# Patient Record
Sex: FEMALE | Race: WHITE | NOT HISPANIC OR LATINO | Employment: FULL TIME | URBAN - METROPOLITAN AREA
[De-identification: names, ages, dates, MRNs, and addresses within clinical notes are randomized per-mention and may not be internally consistent; named-entity substitution may affect disease eponyms.]

---

## 2023-09-19 LAB
CHLAMYDIA TRACH., AMPLIFIED: NEGATIVE
N. GONORRHEA, AMPLIFIED: NEGATIVE

## 2023-10-20 PROBLEM — O16.9 ELEVATED BLOOD PRESSURE AFFECTING PREGNANCY, ANTEPARTUM (HHS-HCC): Status: ACTIVE | Noted: 2023-10-20

## 2023-10-20 PROBLEM — I10 HYPERTENSION, ESSENTIAL: Status: ACTIVE | Noted: 2023-10-20

## 2023-10-20 PROBLEM — O35.2XX1 HEREDITARY DISEASE IN FAMILY POSSIBLY AFFECTING FETUS, AFFECTING MANAGEMENT OF MOTHER IN PREGNANCY, FETUS 1 (HHS-HCC): Status: ACTIVE | Noted: 2023-10-20

## 2023-10-20 PROBLEM — O10.919 CHRONIC HYPERTENSION DURING PREGNANCY, ANTEPARTUM (HHS-HCC): Status: ACTIVE | Noted: 2023-10-20

## 2023-10-20 PROBLEM — O09.819 PREGNANCY RESULTING FROM IN-VITRO FERTILIZATION (HHS-HCC): Status: ACTIVE | Noted: 2023-10-20

## 2023-10-20 PROBLEM — Z14.8 CARRIER OF GENETIC DISORDER: Status: ACTIVE | Noted: 2023-10-20

## 2023-10-20 PROBLEM — F41.9 ANXIETY AND DEPRESSION: Status: ACTIVE | Noted: 2023-10-20

## 2023-10-20 PROBLEM — F32.A ANXIETY AND DEPRESSION: Status: ACTIVE | Noted: 2023-10-20

## 2023-10-20 PROBLEM — E66.811 OBESITY (BMI 30.0-34.9): Status: ACTIVE | Noted: 2023-10-20

## 2023-10-20 PROBLEM — R76.8 DS DNA ANTIBODY POSITIVE: Status: ACTIVE | Noted: 2023-10-20

## 2023-10-20 PROBLEM — E66.9 OBESITY (BMI 30.0-34.9): Status: ACTIVE | Noted: 2023-10-20

## 2023-10-20 RX ORDER — ALPRAZOLAM 0.5 MG/1
1 TABLET ORAL 3 TIMES DAILY PRN
COMMUNITY
Start: 2022-09-13

## 2023-10-20 RX ORDER — LEVONORGESTREL 52 MG/1
INTRAUTERINE DEVICE INTRAUTERINE
COMMUNITY
Start: 2023-09-18

## 2023-10-20 RX ORDER — ASCORBIC ACID, CHOLECALCIFEROL, .ALPHA.-TOCOPHEROL ACETATE, DL-, PYRIDOXINE, FOLIC ACID, CYANOCOBALAMIN, CALCIUM, FERROUS FUMARATE, MAGNESIUM, DOCONEXENT 85; 200; 10; 25; 1; 12; 140; 27; 45; 300 [IU]/1; [IU]/1; [IU]/1; [IU]/1; MG/1; UG/1; MG/1; MG/1; MG/1; MG/1
CAPSULE, GELATIN COATED ORAL DAILY
COMMUNITY
Start: 2020-12-15

## 2023-10-20 RX ORDER — SERTRALINE HYDROCHLORIDE 100 MG/1
1 TABLET, FILM COATED ORAL DAILY
COMMUNITY
Start: 2022-09-13

## 2023-10-23 ENCOUNTER — OFFICE VISIT (OUTPATIENT)
Dept: OBSTETRICS AND GYNECOLOGY | Facility: CLINIC | Age: 33
End: 2023-10-23
Payer: COMMERCIAL

## 2023-10-23 VITALS — BODY MASS INDEX: 37.28 KG/M2 | DIASTOLIC BLOOD PRESSURE: 76 MMHG | SYSTOLIC BLOOD PRESSURE: 104 MMHG | WEIGHT: 224 LBS

## 2023-10-23 DIAGNOSIS — N93.8 DUB (DYSFUNCTIONAL UTERINE BLEEDING): Primary | ICD-10-CM

## 2023-10-23 PROCEDURE — 99213 OFFICE O/P EST LOW 20 MIN: CPT | Performed by: OBSTETRICS & GYNECOLOGY

## 2023-10-23 PROCEDURE — 3074F SYST BP LT 130 MM HG: CPT | Performed by: OBSTETRICS & GYNECOLOGY

## 2023-10-23 PROCEDURE — 3078F DIAST BP <80 MM HG: CPT | Performed by: OBSTETRICS & GYNECOLOGY

## 2023-10-23 NOTE — PROGRESS NOTES
Patient presents for IUD check.  First period was spotty and moderate but this was approximately 2 weeks after placement of the progestin IUD.  Has had no pain or discomfort.  Cannot feel strings, or cervix.    REVIEW OF SYSTEMS    Please see HPI for reported pertinent positives, which would supersede this ROS    Constitutional:  Denies fever, chills, wt gain or loss, fatigue    Genito-Urinary:  Denies genital lesion or sores, vaginal dryness, itching  or pain.  No abnormal vaginal discharge or unexplained vaginal bleeding.  No dysuria, urinary incontinence or frequency.  Denies pelvic pain, dysmenorrhea or dyspareunia.    Eyes:  Denies vision changes, dryness  ENT:  No hearing loss, sinus pain or congestion, nosebleeds  Cardiovascular:  No chest pain or palpitations  Respiratory:  No SOB, cough, wheezing  GI:  No Nausea, vomiting, diarrhea, constipation, abdominal pain  Musculoskeletal:  No new back pain. joint pain, peripheral edema  Skin:  No rash or skin lesion  Neurologic:  No HA, numbness or dizziness  Psychiatric:  No new anxiety or depression  Endocrine:  No loss of hair or hirsutism  Hematologic/lymphatic:  No swollen lymph nodes.  No reported tendency for easy bruising or bleeding    Patient admits to no other systemic complaints      PHYSICAL EXAM      PSYCH:  Pt is alert, oriented and cooperative    SKIN: warm, dry, w/o lesion    HEAD AND FACE:  External inspection of eyes, ears, functional cranial nerves normal and intact    THYROID:  No thyromegaly    CARDIOVASCULAR:  Warm and well Perfused    PULMONARY:  No respiratory distress    ABDOMEN:  soft, nontender.  No mass or organomegaly.      PELVIC:    External genitalia, urethra, perianal region normal to inspection and palpation if indicated.  No inguinal LA    Vagina without lesions.    Cervix seen and visually normal  Strings seen, folding in posterior fornix    Assessment/Plan    DU B associated with IUD placement.  Follow-up as needed.

## 2023-11-07 ENCOUNTER — TELEMEDICINE (OUTPATIENT)
Dept: BEHAVIORAL HEALTH | Facility: CLINIC | Age: 33
End: 2023-11-07
Payer: COMMERCIAL

## 2023-11-07 DIAGNOSIS — F32.A ANXIETY AND DEPRESSION: Primary | ICD-10-CM

## 2023-11-07 DIAGNOSIS — F41.9 ANXIETY AND DEPRESSION: Primary | ICD-10-CM

## 2023-11-07 PROCEDURE — 90837 PSYTX W PT 60 MINUTES: CPT | Mod: 95 | Performed by: PSYCHOLOGIST

## 2023-11-07 NOTE — PROGRESS NOTES
START TIME: 4:05  END TIME: 5p  TOTAL TIME FACE TO FACE: 55 mins    Subjective   Patient ID: Adrienne Cedeño is a 33 y.o. female who presents for   Problem List Items Addressed This Visit       Anxiety and depression - Primary   .    Virtual or Telephone Consent    An interactive audio and video telecommunication system which permits real time communications between the patient (at the originating site) and provider (at the distant site) was utilized to provide this telehealth service.   Verbal consent was requested and obtained from Adrienne Cedeño on this date, 11/08/23 for a telehealth visit.      CONTENT OF SESSION: This was a followup appointment between provider and patient to address symptoms of trauma and anxiety and grief, associated with reproductive loss.     Adrienne reported overall doing “okay” in terms of mood. She described experiences of grief associated with reproductive loss and trauma and described her coping strategies (e.g., social support, limit setting). She noted some distress in context of decision making about potential future intended pregnancy. Time was spent on emotional processing of grief and decision making. Provider gave psychoeducation on positive guided imagery skill and time was spent on discussion of application of this skill to intended future pregnancy. plan made for bimonthly cbt for management of grief and distress with reproductive loss. Provider also gave supportive counseling and normalized common emotional experiences.     Objective   Social History     Substance and Sexual Activity   Alcohol Use Yes    Alcohol/week: 0.0 - 1.0 standard drinks of alcohol      Social History     Social History Narrative    Not on file        Assessment/Plan   Problem List Items Addressed This Visit       Anxiety and depression - Primary   PLAn: plan made for individual CBT to address grief and posttraumatic stress symptoms r/o PTSD and r/lo generalized anxiety disorder.     PRACTICE EXERCISES  PROVIDED CUMULATIVELY: read handout on [employer]AR [employer] . read handout provided on sleep hygiene, and practice cognitive defusion as discussed. practice cognitive restructuring and use the thought record form and challenging questions worksheet. read handouts provided on loss, trauma, and ptsd. practice behavioral activation as discussed (read handout). look at website resource provided on PTSD treatment. followup in 2 weeks

## 2023-11-20 ENCOUNTER — TELEMEDICINE (OUTPATIENT)
Dept: BEHAVIORAL HEALTH | Facility: CLINIC | Age: 33
End: 2023-11-20
Payer: COMMERCIAL

## 2023-11-20 DIAGNOSIS — F41.9 ANXIETY AND DEPRESSION: Primary | ICD-10-CM

## 2023-11-20 DIAGNOSIS — F32.A ANXIETY AND DEPRESSION: Primary | ICD-10-CM

## 2023-11-20 PROCEDURE — 90837 PSYTX W PT 60 MINUTES: CPT | Mod: 95 | Performed by: PSYCHOLOGIST

## 2023-11-20 NOTE — PROGRESS NOTES
START TIME: 4:05  END TIME: 5  TOTAL TIME FACE TO FACE:    Subjective   Patient ID: Adrienne Cedeño is a 33 y.o. female who presents for   Problem List Items Addressed This Visit       Anxiety and depression - Primary   .    Virtual or Telephone Consent    An interactive audio and video telecommunication system which permits real time communications between the patient (at the originating site) and provider (at the distant site) was utilized to provide this telehealth service.   Verbal consent was requested and obtained from Adrienne Cedeño on this date, 11/20/23 for a telehealth visit.      CONTENT OF SESSION:  This was a followup appointment between provider and patient to address symptoms of trauma and anxiety and grief, associated with reproductive loss.        Focus of session was on emotional processing of Adrienne's experience of grief and traumatic, reproductive loss. She described her use of appropriate coping and identified a cope-ahead plan for upcoming anticipated reminders of loss around the holiday season. She also identified aspects of gratitude and use of cognitive restructuring skills. Provider gave positive feedback about this skill use. Provider also reviewed psychoeducation on nature and function of grief as well as cognitive defusion and radical acceptance of emotion skills. Provider also gave supportive counseling and normalized common emotional experiences.      Objective   Social History     Substance and Sexual Activity   Alcohol Use Yes    Alcohol/week: 0.0 - 1.0 standard drinks of alcohol      Social History     Social History Narrative    Not on file        Assessment/Plan   Problem List Items Addressed This Visit       Anxiety and depression - Primary     PLAn: plan made for individual CBT to address grief and posttraumatic stress symptoms r/o PTSD and r/lo generalized anxiety disorder.    PRACTICE EXERCISES PROVIDED CUMULATIVELY: read handout on DEAR [employer] . read handout provided on sleep  hygiene, and practice cognitive defusion as discussed. practice cognitive restructuring and use the thought record form and challenging questions worksheet. read handouts provided on loss, trauma, and ptsd. practice behavioral activation as discussed (read handout). look at website resource provided on PTSD treatment. followup in 2 weeks.

## 2023-12-05 ENCOUNTER — TELEMEDICINE (OUTPATIENT)
Dept: BEHAVIORAL HEALTH | Facility: CLINIC | Age: 33
End: 2023-12-05
Payer: COMMERCIAL

## 2023-12-05 DIAGNOSIS — F41.9 ANXIETY AND DEPRESSION: Primary | ICD-10-CM

## 2023-12-05 DIAGNOSIS — F32.A ANXIETY AND DEPRESSION: Primary | ICD-10-CM

## 2023-12-05 PROCEDURE — 90837 PSYTX W PT 60 MINUTES: CPT | Mod: 95 | Performed by: PSYCHOLOGIST

## 2023-12-06 NOTE — PROGRESS NOTES
START TIME: 4p  END TIME: 5p  TOTAL TIME FACE TO FACE: 60 mins    Subjective   Patient ID: Adrienne Cedeño is a 33 y.o. female who presents for   Problem List Items Addressed This Visit       Anxiety and depression - Primary   .    Virtual or Telephone Consent    An interactive audio and video telecommunication system which permits real time communications between the patient (at the originating site) and provider (at the distant site) was utilized to provide this telehealth service.   Verbal consent was requested and obtained from Adrienne Cedeño on this date, 12/5/2023 for a telehealth visit.      CONTENT OF SESSION:  This was a followup appointment between provider and patient to address symptoms of trauma and anxiety and grief, associated with reproductive loss.     Focus of session was on emotional processing of Adrienne's experience with reproductive losses and experiencing triggers/reminders of losses, as well as navigating social reactions to her losses. She described normative grief response and discussed her engagement with memorial acts (e.g., has pieces of artwork with her babies' prints). She noted some anxiety and frustration in terms of how some family members avoid talking about her losses. Time was spent on role play of assertive communication. Provider also gave psychoeducation on nature and function of grief and common psychological reactions to reproductive loss. Provider also gave supportive counseling and normalized common emotional experiences.      Objective   Social History     Substance and Sexual Activity   Alcohol Use Yes    Alcohol/week: 0.0 - 1.0 standard drinks of alcohol      Social History     Social History Narrative    Not on file        Assessment/Plan   Problem List Items Addressed This Visit       Anxiety and depression - Primary       PLAn: plan made for individual CBT to address grief and posttraumatic stress symptoms r/o PTSD and r/lo generalized anxiety disorder.    PRACTICE  EXERCISES PROVIDED CUMULATIVELY: read handout on DEAR [employer] . read handout provided on sleep hygiene, and practice cognitive defusion as discussed. practice cognitive restructuring and use the thought record form and challenging questions worksheet. read handouts provided on loss, trauma, and ptsd. practice behavioral activation as discussed (read handout). look at website resource provided on PTSD treatment. followup in 2 weeks

## 2023-12-18 ENCOUNTER — TELEMEDICINE (OUTPATIENT)
Dept: BEHAVIORAL HEALTH | Facility: CLINIC | Age: 33
End: 2023-12-18
Payer: COMMERCIAL

## 2023-12-18 DIAGNOSIS — F41.9 ANXIETY AND DEPRESSION: Primary | ICD-10-CM

## 2023-12-18 DIAGNOSIS — F32.A ANXIETY AND DEPRESSION: Primary | ICD-10-CM

## 2023-12-18 PROCEDURE — 90837 PSYTX W PT 60 MINUTES: CPT | Mod: 95 | Performed by: PSYCHOLOGIST

## 2023-12-19 NOTE — PROGRESS NOTES
START TIME: 4:05  END TIME: 5  TOTAL TIME FACE TO FACE: 55mins    Subjective   Patient ID: Adrienne Cedeño is a 33 y.o. female who presents for   Problem List Items Addressed This Visit       Anxiety and depression - Primary   .    Virtual or Telephone Consent    An interactive audio and video telecommunication system which permits real time communications between the patient (at the originating site) and provider (at the distant site) was utilized to provide this telehealth service.   Verbal consent was requested and obtained from Adrienne Cedeño on this date, 12/18/23 for a telehealth visit.      CONTENT OF SESSION: This was a followup appointment between provider and patient for individual therapy to address symptoms of trauma and anxiety and grief, associated with reproductive loss.        Adrienne noted some increased stress with wrapping up work prior to winter break and holiday season. Adrienne described her recent experience of use of assertive communication to increase social support in context of grieving of reproductive loss, and experienced positive, receptive reactions and support. Provider gave positive feedback about this skill use. Provider reviewed psychoeducation on common emotional reactions to reproductive loss and navigating social reactions. Provider also encouraged use of “5-4-3-2-1” sensory mindfulness exercise during holiday season. Provider also gave supportive counseling and normalized common emotional experiences.       Objective   Social History     Substance and Sexual Activity   Alcohol Use Yes    Alcohol/week: 0.0 - 1.0 standard drinks of alcohol      Social History     Social History Narrative    Not on file        Assessment/Plan   Problem List Items Addressed This Visit       Anxiety and depression - Primary       PLAn: plan made for individual CBT to address grief and posttraumatic stress symptoms r/o PTSD and r/lo generalized anxiety disorder.    PRACTICE EXERCISES PROVIDED  CUMULATIVELY: read handout on DEAR [employer] . read handout provided on sleep hygiene, and practice cognitive defusion as discussed. practice cognitive restructuring and use the thought record form and challenging questions worksheet. read handouts provided on loss, trauma, and ptsd. practice behavioral activation as discussed (read handout). look at website resource provided on PTSD treatment. followup in 2 weeks

## 2024-01-22 ENCOUNTER — TELEMEDICINE (OUTPATIENT)
Dept: BEHAVIORAL HEALTH | Facility: CLINIC | Age: 34
End: 2024-01-22
Payer: COMMERCIAL

## 2024-01-22 DIAGNOSIS — F41.9 ANXIETY AND DEPRESSION: Primary | ICD-10-CM

## 2024-01-22 DIAGNOSIS — F32.A ANXIETY AND DEPRESSION: Primary | ICD-10-CM

## 2024-01-22 PROCEDURE — 90834 PSYTX W PT 45 MINUTES: CPT | Mod: 95 | Performed by: PSYCHOLOGIST

## 2024-01-22 NOTE — PROGRESS NOTES
Please continue to follow a heart healthy diet, low in sodium, cholesterol and fats. We recommend a Mediterranean diet:  -Incorporate 2 or more servings per day of vegetables, fruits, and whole grains  -Increase intake of fish and legumes/beans to 2 or more servings per week  -Increase intake of healthy fats, such as olive oil and avocados, and have a handful of nuts/seeds most days  -Reduce red/processed meat consumption to 2 or fewer times per week  START TIME: 4:05  END TIME: 4:55  TOTAL TIME FACE TO FACE: 50mins    Subjective   Patient ID: Adrienne Cedeño is a 33 y.o. female who presents for   Problem List Items Addressed This Visit       Anxiety and depression - Primary   .    Virtual or Telephone Consent    An interactive audio and video telecommunication system which permits real time communications between the patient (at the originating site) and provider (at the distant site) was utilized to provide this telehealth service.   Verbal consent was requested and obtained from Adrienne Cedeño on this date, 01/23/24 for a telehealth visit.      CONTENT OF SESSION: This was a followup appointment between provider and patient for individual therapy to address symptoms of trauma and anxiety and grief, associated with reproductive loss.         Time was spent on emotional processing of grief associated with reproductive losses, in part associated with recent reminders (e.g., what would have been late baby's first birthday). Provider reviewed psychoeducation on common psychological reactions to reproductive loss. Adrienne described appropriate coping strategies including positive social support engagement, cognitive restructuring, and accepting emotions mindfulness. Time was also spent on her decision-making process related to possible future family planning. Provider also gave supportive counseling and normalized common emotional experiences.      Objective   Social History     Substance and Sexual Activity   Alcohol Use Yes    Alcohol/week: 0.0 - 1.0 standard drinks of alcohol      Social History     Social History Narrative    Not on file        Assessment/Plan   Problem List Items Addressed This Visit       Anxiety and depression - Primary       PLAn: plan made for individual CBT to address grief and posttraumatic stress symptoms r/o PTSD and r/lo generalized anxiety disorder.    PRACTICE EXERCISES PROVIDED CUMULATIVELY: read handout on [employer] . read handout  provided on sleep hygiene, and practice cognitive defusion as discussed. practice cognitive restructuring and use the thought record form and challenging questions worksheet. read handouts provided on loss, trauma, and ptsd. practice behavioral activation as discussed (read handout). look at website resource provided on PTSD treatment. followup in 2 weeks

## 2024-02-06 ENCOUNTER — TELEMEDICINE (OUTPATIENT)
Dept: BEHAVIORAL HEALTH | Facility: CLINIC | Age: 34
End: 2024-02-06
Payer: COMMERCIAL

## 2024-02-06 DIAGNOSIS — F32.A ANXIETY AND DEPRESSION: Primary | ICD-10-CM

## 2024-02-06 DIAGNOSIS — F41.9 ANXIETY AND DEPRESSION: Primary | ICD-10-CM

## 2024-02-06 PROCEDURE — 90837 PSYTX W PT 60 MINUTES: CPT | Mod: 95 | Performed by: PSYCHOLOGIST

## 2024-02-07 NOTE — PROGRESS NOTES
START TIME: 4:05  END TIME: 5  TOTAL TIME FACE TO FACE: 55mins    Subjective   Patient ID: Adrienne Cedeño is a 33 y.o. female who presents for   Problem List Items Addressed This Visit       Anxiety and depression - Primary   .    Virtual or Telephone Consent    An interactive audio and video telecommunication system which permits real time communications between the patient (at the originating site) and provider (at the distant site) was utilized to provide this telehealth service.   Verbal consent was requested and obtained from Adrienne Cedeño on this date, 2/6/24 for a telehealth visit.      CONTENT OF SESSION: This was a followup appointment between provider and patient for individual therapy to address symptoms of trauma and anxiety and grief, associated with reproductive loss.         Adrienne reported feeling “exhausted” lately in context of general psychosocial stressors. She noted at times she stays up late in response, to saul or do something pleasant. Provider reviewed psychoeducation on sleep hygiene and possible role of sleep in mood. Time was also spent on emotional processing of grief associated with pregnancy loss; The anniversary milestone of her first pregnancy loss is next week- she described appropriate use of coping in response to grief (e.g., accepting emotions mindfulness, setting limits, positive social support). Provider gave feedback about this skill use. Provider also gave supportive counseling and normalized common emotional experiences.      Objective   Social History     Substance and Sexual Activity   Alcohol Use Yes    Alcohol/week: 0.0 - 1.0 standard drinks of alcohol      Social History     Social History Narrative    Not on file        Assessment/Plan   Problem List Items Addressed This Visit       Anxiety and depression - Primary       PLAn: plan made for individual CBT to address grief and posttraumatic stress symptoms r/o PTSD and r/lo generalized anxiety disorder.    PRACTICE  EXERCISES PROVIDED CUMULATIVELY: read handout on DEAR [employer] . read handout provided on sleep hygiene, and practice cognitive defusion as discussed. practice cognitive restructuring and use the thought record form and challenging questions worksheet. read handouts provided on loss, trauma, and ptsd. practice behavioral activation as discussed (read handout). look at website resource provided on PTSD treatment. followup in 2 weeks

## 2024-02-19 ENCOUNTER — APPOINTMENT (OUTPATIENT)
Dept: BEHAVIORAL HEALTH | Facility: CLINIC | Age: 34
End: 2024-02-19
Payer: COMMERCIAL

## 2024-03-05 ENCOUNTER — TELEMEDICINE (OUTPATIENT)
Dept: BEHAVIORAL HEALTH | Facility: CLINIC | Age: 34
End: 2024-03-05
Payer: COMMERCIAL

## 2024-03-05 DIAGNOSIS — F32.A ANXIETY AND DEPRESSION: Primary | ICD-10-CM

## 2024-03-05 DIAGNOSIS — F41.9 ANXIETY AND DEPRESSION: Primary | ICD-10-CM

## 2024-03-05 PROCEDURE — 90837 PSYTX W PT 60 MINUTES: CPT | Performed by: PSYCHOLOGIST

## 2024-03-05 NOTE — PROGRESS NOTES
START TIME: 4:00  END TIME: 5  TOTAL TIME FACE TO FACE:    Subjective   Patient ID: Adrienne Cedñeo is a 33 y.o. female who presents for   Problem List Items Addressed This Visit       Anxiety and depression - Primary   .    Virtual or Telephone Consent    An interactive audio and video telecommunication system which permits real time communications between the patient (at the originating site) and provider (at the distant site) was utilized to provide this telehealth service.   Verbal consent was requested and obtained from Adrienne Cedeño on this date, 03/06/24 for a telehealth visit.      CONTENT OF SESSION:  This was a followup appointment between provider and patient for individual therapy to address symptoms of trauma and anxiety and grief, associated with reproductive loss.         Focus of session was on Adrienne's experience of grief and trauma symptoms related to reproductive losses and her decision making related to possible future family planning. She identified some avoidance behaviors of reminders of losses (e.g., not taking prenatal vitamins). Time was spent on emotional processing of losses.  Provider reviewed psychoeducation on common psychological reactions to reproductive loss and trauma, and on the role of cognition in anxiety and anxious avoidance behaviors. Adrienne identified her goals related to family planning and her relevant personal values to inform her decision making. Provider gave positive feedback about this skill use. Provider also gave supportive counseling and normalized common emotional experiences.         Objective   Social History     Substance and Sexual Activity   Alcohol Use Yes    Alcohol/week: 0.0 - 1.0 standard drinks of alcohol      Social History     Social History Narrative    Not on file        Assessment/Plan   Problem List Items Addressed This Visit       Anxiety and depression - Primary     Plan: plan made for individual CBT to address grief and posttraumatic stress  symptoms r/o PTSD and r/lo generalized anxiety disorder.    PRACTICE EXERCISES PROVIDED CUMULATIVELY: read handout on DEAR [employer] . read handout provided on sleep hygiene, and practice cognitive defusion as discussed. practice cognitive restructuring and use the thought record form and challenging questions worksheet. read handouts provided on loss, trauma, and ptsd. practice behavioral activation as discussed (read handout). look at website resource provided on PTSD treatment. followup in 2 weeks

## 2024-03-18 ENCOUNTER — TELEMEDICINE (OUTPATIENT)
Dept: BEHAVIORAL HEALTH | Facility: CLINIC | Age: 34
End: 2024-03-18
Payer: COMMERCIAL

## 2024-03-18 DIAGNOSIS — F41.9 ANXIETY AND DEPRESSION: Primary | ICD-10-CM

## 2024-03-18 DIAGNOSIS — F32.A ANXIETY AND DEPRESSION: Primary | ICD-10-CM

## 2024-03-18 PROCEDURE — 90837 PSYTX W PT 60 MINUTES: CPT | Performed by: PSYCHOLOGIST

## 2024-03-19 NOTE — PROGRESS NOTES
START TIME: 4:05   END TIME: 5  TOTAL TIME FACE TO FACE: 55mins    Subjective   Patient ID: Adrienne Cedeño is a 33 y.o. female who presents for   Problem List Items Addressed This Visit       Anxiety and depression - Primary   .    Virtual or Telephone Consent    An interactive audio and video telecommunication system which permits real time communications between the patient (at the originating site) and provider (at the distant site) was utilized to provide this telehealth service.   Verbal consent was requested and obtained from Adrienne Cedeño on this date, 3/18/24 for a telehealth visit.      TREATMENT: cognitive behavior therapy  Symptoms: anxiety, grief  CONTENT OF SESSION:  This was a followup appointment between provider and patient for individual therapy to address symptoms of trauma and anxiety and grief, associated with reproductive loss.         Focus of session was continued on Adrienne's experience of grief and trauma symptoms related to reproductive losses and her decision making related to possible future family planning. She noticed more frequent loss-related nightmares in the past two weeks; she described appropriate coping including cognitive defusion and acceptance skills, and minimizing anxious avoidance. She discussed her decision making related to possible future family building and impact of past reproductive trauma. Provider reviewed psychoeducation on common psychological reactions to reproductive trauma and loss, and infertility treatment. Provider also gave supportive counseling and normalized common emotional experiences.  adrienne is also considering making appt with [facility].          Objective   Social History     Substance and Sexual Activity   Alcohol Use Yes    Alcohol/week: 0.0 - 1.0 standard drinks of alcohol      Social History     Social History Narrative    Not on file        Assessment/Plan   Problem List Items Addressed This Visit       Anxiety and depression - Primary     Plan:  plan made for individual CBT to address grief and posttraumatic stress symptoms r/o PTSD and r/lo generalized anxiety disorder.    PRACTICE EXERCISES PROVIDED CUMULATIVELY: read handout on DEAR [employer] . read handout provided on sleep hygiene, and practice cognitive defusion as discussed. practice cognitive restructuring and use the thought record form and challenging questions worksheet. read handouts provided on loss, trauma, and ptsd. practice behavioral activation as discussed (read handout). look at website resource provided on PTSD treatment. followup in 2 weeks

## 2024-04-02 ENCOUNTER — TELEMEDICINE (OUTPATIENT)
Dept: BEHAVIORAL HEALTH | Facility: CLINIC | Age: 34
End: 2024-04-02
Payer: COMMERCIAL

## 2024-04-02 DIAGNOSIS — F41.9 ANXIETY AND DEPRESSION: ICD-10-CM

## 2024-04-02 DIAGNOSIS — F32.A ANXIETY AND DEPRESSION: ICD-10-CM

## 2024-04-02 DIAGNOSIS — F43.21 ADJUSTMENT DISORDER WITH DEPRESSED MOOD: Primary | ICD-10-CM

## 2024-04-02 PROCEDURE — 90837 PSYTX W PT 60 MINUTES: CPT | Performed by: PSYCHOLOGIST

## 2024-04-03 PROBLEM — F43.21 ADJUSTMENT DISORDER WITH DEPRESSED MOOD: Status: ACTIVE | Noted: 2024-04-03

## 2024-04-03 NOTE — PROGRESS NOTES
START TIME: 4:05   END TIME: 5  TOTAL TIME FACE TO FACE: 55mins    Subjective   Patient ID: Adrienne Cedeño is a 33 y.o. female who presents for   Problem List Items Addressed This Visit       Anxiety and depression    Adjustment disorder with depressed mood - Primary   .    Virtual or Telephone Consent    An interactive audio and video telecommunication system which permits real time communications between the patient (at the originating site) and provider (at the distant site) was utilized to provide this telehealth service.   Verbal consent was requested and obtained from Adrienne Cedeño on this date, 04/03/24 for a telehealth visit.      TREATMENT: cognitive behavior therapy  Symptoms: anxiety, grief  CONTENT OF SESSION:  This was a followup appointment between provider and patient for individual therapy to address symptoms of trauma and anxiety and grief, associated with reproductive loss.         Focus of session was continued on Adrienne's experience of grief and trauma symptoms related to reproductive losses. She discussed recent triggers/reminders that she and her  encountered. She also discussed their experience with communication together and providing support to each other. Adrienne described appropriate use of assertive communication skills and self-care and cognitive restructuring to manage grief. Provider reviewed psychoeducation on nature and function of grief. Provider also gave supportive counseling and normalized common emotional experiences.    Objective   Social History     Substance and Sexual Activity   Alcohol Use Yes    Alcohol/week: 0.0 - 1.0 standard drinks of alcohol      Social History     Social History Narrative    Not on file        Assessment/Plan   Problem List Items Addressed This Visit       Anxiety and depression    Adjustment disorder with depressed mood - Primary     Plan: plan made for individual CBT to address grief and posttraumatic stress symptoms r/o PTSD and r/lo  generalized anxiety disorder.    PRACTICE EXERCISES PROVIDED CUMULATIVELY: read handout on DEAR MAN . read handout provided on sleep hygiene, and practice cognitive defusion as discussed. practice cognitive restructuring and use the thought record form and challenging questions worksheet. read handouts provided on loss, trauma, and ptsd. practice behavioral activation as discussed (read handout). look at website resource provided on PTSD treatment. followup in 2 weeks

## 2024-04-15 ENCOUNTER — TELEMEDICINE (OUTPATIENT)
Dept: BEHAVIORAL HEALTH | Facility: CLINIC | Age: 34
End: 2024-04-15
Payer: COMMERCIAL

## 2024-04-15 DIAGNOSIS — F32.A ANXIETY AND DEPRESSION: ICD-10-CM

## 2024-04-15 DIAGNOSIS — F43.21 ADJUSTMENT DISORDER WITH DEPRESSED MOOD: Primary | ICD-10-CM

## 2024-04-15 DIAGNOSIS — F41.9 ANXIETY AND DEPRESSION: ICD-10-CM

## 2024-04-15 PROCEDURE — 90837 PSYTX W PT 60 MINUTES: CPT | Performed by: PSYCHOLOGIST

## 2024-04-15 NOTE — PROGRESS NOTES
START TIME: 4:05p  END TIME: 5p  TOTAL TIME FACE TO FACE: 55mins    Subjective   Patient ID: Adrienne Cedeño is a 33 y.o. female who presents for   Problem List Items Addressed This Visit       Anxiety and depression    Adjustment disorder with depressed mood - Primary   .    Virtual or Telephone Consent    An interactive audio and video telecommunication system which permits real time communications between the patient (at the originating site) and provider (at the distant site) was utilized to provide this telehealth service.   Verbal consent was requested and obtained from Adrienne Cedeño on this date, 04/15/24 for a telehealth visit.    TREATMENT: cognitive behavior therapy  Symptoms: anxiety, grief  CONTENT OF SESSION:  This was a followup appointment between provider and patient for individual therapy to address symptoms of trauma and anxiety and grief, associated with reproductive loss.         Focus of session was continued on Adrienne's experience of grief and trauma symptoms related to reproductive losses, and on her decision making related to potential family planning/intended pregnancy. She started taking prenatal vitamins today. Time was also spent on positive guided imagery exposure and identification of potential social supports in intended pregnancy. Provider reviewed psychoeducation on common psychological reactions to reproductive loss and on assertive communication strategies. Provider also gave supportive counseling and normalized common emotional experiences.      Objective   Social History     Substance and Sexual Activity   Alcohol Use Yes    Alcohol/week: 0.0 - 1.0 standard drinks of alcohol      Social History     Social History Narrative    Not on file        Assessment/Plan   Problem List Items Addressed This Visit       Anxiety and depression    Adjustment disorder with depressed mood - Primary     Plan: plan made for individual CBT to address grief and posttraumatic stress symptoms r/o PTSD  and r/o generalized anxiety disorder.    PRACTICE EXERCISES PROVIDED CUMULATIVELY: read handout on DEAR MAN . read handout provided on sleep hygiene, and practice cognitive defusion as discussed. practice cognitive restructuring and use the thought record form and challenging questions worksheet. read handouts provided on loss, trauma, and ptsd. practice behavioral activation as discussed (read handout). look at website resource provided on PTSD treatment. followup in 2 weeks

## 2024-04-29 ENCOUNTER — TELEMEDICINE (OUTPATIENT)
Dept: BEHAVIORAL HEALTH | Facility: CLINIC | Age: 34
End: 2024-04-29
Payer: COMMERCIAL

## 2024-04-29 DIAGNOSIS — F32.A ANXIETY AND DEPRESSION: Primary | ICD-10-CM

## 2024-04-29 DIAGNOSIS — F43.21 ADJUSTMENT DISORDER WITH DEPRESSED MOOD: ICD-10-CM

## 2024-04-29 DIAGNOSIS — F41.9 ANXIETY AND DEPRESSION: Primary | ICD-10-CM

## 2024-04-29 PROCEDURE — 90837 PSYTX W PT 60 MINUTES: CPT | Performed by: PSYCHOLOGIST

## 2024-04-29 NOTE — PROGRESS NOTES
START TIME: 4:05   END TIME: 5p  TOTAL TIME FACE TO FACE: 55mins    Subjective   Patient ID: Adrienne Cedeño is a 33 y.o. female who presents for   Problem List Items Addressed This Visit       Anxiety and depression - Primary    Adjustment disorder with depressed mood   .    Virtual or Telephone Consent    An interactive audio and video telecommunication system which permits real time communications between the patient (at the originating site) and provider (at the distant site) was utilized to provide this telehealth service.   Verbal consent was requested and obtained from dArienne Cedeño on this date, 4/29/24 for a telehealth visit.      TREATMENT: cognitive behavior therapy  Symptoms: anxiety, grief    CONTENT OF SESSION:  This was a followup appointment between provider and patient for individual therapy to address symptoms of trauma and anxiety and grief, associated with reproductive loss.    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     Focus of session was continued on Adrienne's experience of grief and anxiety/trauma symptoms related to reproductive losses. Adrienne described recent interpersonal interaction that triggered feelings of frustration. She described her use of assertive communication skills and discussed potential future opportunities for assertive communication; time was spent on some role play of communication in line with the DEAR MAN model (from DBT skills). Provider reviewed psychoeducation on nature and function of grief and on common psychological reactions to reproductive loss as well as assertive communication strategies. Provider also gave supportive counseling and normalized common emotional experiences.         Objective   Social History     Substance and Sexual Activity   Alcohol Use Yes    Alcohol/week: 0.0 - 1.0 standard drinks of alcohol      Social History     Social History Narrative    Not on file        Assessment/Plan   Problem List Items  Addressed This Visit       Anxiety and depression - Primary    Adjustment disorder with depressed mood   Plan: plan made for individual CBT to address grief and posttraumatic stress symptoms r/o PTSD and r/o generalized anxiety disorder.    PRACTICE EXERCISES PROVIDED CUMULATIVELY: read handout on DEAR MAN . read handout provided on sleep hygiene, and practice cognitive defusion as discussed. practice cognitive restructuring and use the thought record form and challenging questions worksheet. read handouts provided on loss, trauma, and ptsd. practice behavioral activation as discussed (read handout). look at website resource provided on PTSD treatment. followup in 2 weeks

## 2024-05-13 ENCOUNTER — APPOINTMENT (OUTPATIENT)
Dept: BEHAVIORAL HEALTH | Facility: CLINIC | Age: 34
End: 2024-05-13
Payer: COMMERCIAL

## 2024-05-29 ENCOUNTER — TELEMEDICINE (OUTPATIENT)
Dept: BEHAVIORAL HEALTH | Facility: CLINIC | Age: 34
End: 2024-05-29
Payer: COMMERCIAL

## 2024-05-29 DIAGNOSIS — F32.A ANXIETY AND DEPRESSION: ICD-10-CM

## 2024-05-29 DIAGNOSIS — F41.9 ANXIETY AND DEPRESSION: ICD-10-CM

## 2024-05-29 DIAGNOSIS — F43.21 ADJUSTMENT DISORDER WITH DEPRESSED MOOD: Primary | ICD-10-CM

## 2024-05-29 PROCEDURE — 90837 PSYTX W PT 60 MINUTES: CPT | Performed by: PSYCHOLOGIST

## 2024-05-30 NOTE — PROGRESS NOTES
START TIME: 4:05  END TIME: 5  TOTAL TIME FACE TO FACE: 55mins    Subjective   Patient ID: Adrienne Cedeño is a 33 y.o. female who presents for   Problem List Items Addressed This Visit       Anxiety and depression    Adjustment disorder with depressed mood - Primary   .    Virtual or Telephone Consent    An interactive audio and video telecommunication system which permits real time communications between the patient (at the originating site) and provider (at the distant site) was utilized to provide this telehealth service.   Verbal consent was requested and obtained from Adrienne Cedeño on this date, 05/30/24 for a telehealth visit.      TREATMENT: cognitive behavior therapy  Symptoms: anxiety, grief     CONTENT OF SESSION:  This was a followup appointment between provider and patient for individual therapy to address symptoms of trauma and anxiety and grief, associated with reproductive loss.    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   Time was spent on emotional processing of stressors. Adrienne described appropriate use of coping strategies, such as limit setting and cognitive restructuring, and provider gave positive feedback about this skill use. Adrienne also discussed her decision making related to potential family building. Provider also encouraged development of an in vivo exposure hierarchy to help guide exposure to (safe) reminders of reproductive loss that Adrienne may encounter in the future.  Provider also gave supportive counseling and normalized common emotional experiences.         Objective   Social History     Substance and Sexual Activity   Alcohol Use Yes    Alcohol/week: 0.0 - 1.0 standard drinks of alcohol      Social History     Social History Narrative    Not on file        Assessment/Plan   Problem List Items Addressed This Visit       Anxiety and depression    Adjustment disorder with depressed mood -  Primary     Plan: plan made for individual CBT to address grief and posttraumatic stress symptoms r/o PTSD and r/o generalized anxiety disorder.    PRACTICE EXERCISES PROVIDED CUMULATIVELY: read handout on DEAR MAN . read handout provided on sleep hygiene, and practice cognitive defusion as discussed. practice cognitive restructuring and use the thought record form and challenging questions worksheet. read handouts provided on loss, trauma, and ptsd. practice behavioral activation as discussed (read handout). look at website resource provided on PTSD treatment. followup in 2 weeks

## 2024-06-10 ENCOUNTER — TELEMEDICINE (OUTPATIENT)
Dept: BEHAVIORAL HEALTH | Facility: CLINIC | Age: 34
End: 2024-06-10
Payer: COMMERCIAL

## 2024-06-10 DIAGNOSIS — F41.9 ANXIETY AND DEPRESSION: ICD-10-CM

## 2024-06-10 DIAGNOSIS — F43.21 ADJUSTMENT DISORDER WITH DEPRESSED MOOD: Primary | ICD-10-CM

## 2024-06-10 DIAGNOSIS — F32.A ANXIETY AND DEPRESSION: ICD-10-CM

## 2024-06-10 PROCEDURE — 90837 PSYTX W PT 60 MINUTES: CPT | Performed by: PSYCHOLOGIST

## 2024-06-11 NOTE — PROGRESS NOTES
START TIME: 4:05  END TIME: 5  TOTAL TIME FACE TO FACE: 55mins    Subjective   Patient ID: Adrienne Cedeño is a 33 y.o. female who presents for   Problem List Items Addressed This Visit       Anxiety and depression    Adjustment disorder with depressed mood - Primary   .    Virtual or Telephone Consent    An interactive audio and video telecommunication system which permits real time communications between the patient (at the originating site) and provider (at the distant site) was utilized to provide this telehealth service.   Verbal consent was requested and obtained from Adrienne Cedeño on this date, 6/10/24 for a telehealth visit.        TREATMENT: cognitive behavior therapy  Symptoms: anxiety, grief     CONTENT OF SESSION:  This was a followup appointment between provider and patient for individual therapy to address symptoms of trauma and anxiety and grief, associated with reproductive loss.    Adrienne noted some increased stress/fatigue in context of current stressors- she's had a lot of family events, including planning her sister's wedding shower and preparing for her sisters wedding. She described appropriate use of activity pacing and limit setting to manage stress. Provider gave positive feedback about this skill use.     Time was spent on emotional processing of reminders of reproductive loss and navigating social reactions/support. Time was also spent on Adrienne's decision making related to potential future attempts at pregnancy and on communication with her partner. Provider reviewed psychoeducation on common reactions to reproductive loss and trauma.  Provider also gave supportive counseling and normalized common emotional experiences.       Objective   Social History     Substance and Sexual Activity   Alcohol Use Yes    Alcohol/week: 0.0 - 1.0 standard drinks of alcohol      Social History     Social History Narrative    Not on file        Assessment/Plan   Problem List Items Addressed This Visit        Anxiety and depression    Adjustment disorder with depressed mood - Primary     Plan: plan made for individual CBT to address grief and posttraumatic stress symptoms r/o PTSD and r/o generalized anxiety disorder.    PRACTICE EXERCISES PROVIDED CUMULATIVELY: read handout on DEAR MAN . read handout provided on sleep hygiene, and practice cognitive defusion as discussed. practice cognitive restructuring and use the thought record form and challenging questions worksheet. read handouts provided on loss, trauma, and ptsd. practice behavioral activation as discussed (read handout). look at website resource provided on PTSD treatment. followup in 2 weeks

## 2024-06-24 ENCOUNTER — APPOINTMENT (OUTPATIENT)
Dept: OBSTETRICS AND GYNECOLOGY | Facility: CLINIC | Age: 34
End: 2024-06-24
Payer: COMMERCIAL

## 2024-06-26 ENCOUNTER — APPOINTMENT (OUTPATIENT)
Dept: BEHAVIORAL HEALTH | Facility: CLINIC | Age: 34
End: 2024-06-26
Payer: COMMERCIAL

## 2024-06-26 DIAGNOSIS — F43.21 ADJUSTMENT DISORDER WITH DEPRESSED MOOD: Primary | ICD-10-CM

## 2024-06-26 DIAGNOSIS — F41.9 ANXIETY AND DEPRESSION: ICD-10-CM

## 2024-06-26 DIAGNOSIS — F32.A ANXIETY AND DEPRESSION: ICD-10-CM

## 2024-06-26 PROCEDURE — 90837 PSYTX W PT 60 MINUTES: CPT | Performed by: PSYCHOLOGIST

## 2024-06-26 NOTE — PROGRESS NOTES
START TIME: 4:05   END TIME: 5  TOTAL TIME FACE TO FACE: 55mins    Subjective   Patient ID: Adrienne Cedeño is a 34 y.o. female who presents for   Problem List Items Addressed This Visit       Anxiety and depression    Adjustment disorder with depressed mood - Primary   .    Virtual or Telephone Consent    An interactive audio and video telecommunication system which permits real time communications between the patient (at the originating site) and provider (at the distant site) was utilized to provide this telehealth service.   Verbal consent was requested and obtained from Adrienne Cedeño on this date, 06/26/24 for a telehealth visit.      TREATMENT: cognitive behavior therapy  Symptoms: anxiety, grief     CONTENT OF SESSION:  This was a followup appointment between provider and patient for individual therapy to address symptoms of trauma and anxiety and grief, associated with reproductive loss.       Today is anniversary date of estimated due date of pregnancy loss. Adrienne reported normative grief response and noted she has received positive social support from her family. Time was spent on emotional processing of impact of pregnancy losses. Provider gave flyer for [facility] remembrance walk. Time was also spent on discussion of Adirenne's decision making related to potential future intended pregnancy; she identified anxiety/trauma/grief triggers. Provider reviewed psychoeducation on common psychological reactions to reproductive loss/trauma and on potential strategies for gradual in vivo exposure to (safe) triggers (e.g., going to medical appointments), and on assertive communication. Provider also gave supportive counseling and normalized common emotional experiences.      Objective   Social History     Substance and Sexual Activity   Alcohol Use Yes    Alcohol/week: 0.0 - 1.0 standard drinks of alcohol      Social History     Social History Narrative    Not on file        Assessment/Plan   Problem List Items Addressed  This Visit       Anxiety and depression    Adjustment disorder with depressed mood - Primary     Plan: plan made for individual CBT to address grief and posttraumatic stress symptoms r/o PTSD and r/o generalized anxiety disorder.    PRACTICE EXERCISES PROVIDED CUMULATIVELY: read handout on DEAR MAN . read handout provided on sleep hygiene, and practice cognitive defusion as discussed. practice cognitive restructuring and use the thought record form and challenging questions worksheet. read handouts provided on loss, trauma, and ptsd. practice behavioral activation as discussed (read handout). look at website resource provided on PTSD treatment. followup in 2 weeks

## 2024-07-01 ENCOUNTER — APPOINTMENT (OUTPATIENT)
Dept: OBSTETRICS AND GYNECOLOGY | Facility: CLINIC | Age: 34
End: 2024-07-01
Payer: COMMERCIAL

## 2024-07-01 VITALS — DIASTOLIC BLOOD PRESSURE: 78 MMHG | SYSTOLIC BLOOD PRESSURE: 126 MMHG | WEIGHT: 203 LBS | BODY MASS INDEX: 33.78 KG/M2

## 2024-07-01 DIAGNOSIS — Z30.431 IUD CHECK UP: ICD-10-CM

## 2024-07-01 PROCEDURE — 3078F DIAST BP <80 MM HG: CPT | Performed by: OBSTETRICS & GYNECOLOGY

## 2024-07-01 PROCEDURE — 99214 OFFICE O/P EST MOD 30 MIN: CPT | Performed by: OBSTETRICS & GYNECOLOGY

## 2024-07-01 PROCEDURE — 3074F SYST BP LT 130 MM HG: CPT | Performed by: OBSTETRICS & GYNECOLOGY

## 2024-07-01 NOTE — PROGRESS NOTES
"Chief Complaint   Patient presents with    Contraception     Check IUD Placement        C/O after ending her period on 6/15/24 she felt discomfort like \"a tampon was stuck\", some pain with intercourse, and \"small pinching\" feeling inside the vaginal canal.     in room with patient.     Has had IUD since October and overall doing well with it.  Last 2 weeks she has noticed occasional pinching discomfort.  Also feeling fullness in the upper vagina.  Concerned about IUD movement versus prolapse.    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   is as well.  First pregnancy was terminated due to an affected child.  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  Saw Dr. Shania Bridges and consult after.  They did discuss potential interventions including aspirin or Lovenox.  Patient does not have documentation of that visit.  Dr. Bridges has subsequently moved onto CC.    REVIEW OF SYSTEMS    Please see HPI for reported pertinent positives, which would supersede this ROS    Constitutional:  Denies fever, chills, wt gain or loss, fatigue    Genito-Urinary:  Denies genital lesion or sores, vaginal dryness, itching  or pain.  No abnormal vaginal discharge or unexplained vaginal bleeding.  No dysuria, urinary incontinence or frequency.  Denies pelvic pain, dysmenorrhea or dyspareunia.    Eyes:  Denies vision changes, dryness  ENT:  No hearing loss, sinus pain or congestion, nosebleeds  Cardiovascular:  No chest pain or palpitations  Respiratory:  No SOB, cough, wheezing  GI:  No Nausea, vomiting, diarrhea, constipation, abdominal pain  Musculoskeletal:  No new back pain. joint pain, peripheral edema  Skin:  No rash or skin lesion  Neurologic:  No HA, numbness or dizziness  Psychiatric:  No new anxiety or depression  Endocrine:  No loss of hair or " hirsutism  Hematologic/lymphatic:  No swollen lymph nodes.  No reported tendency for easy bruising or bleeding    Patient admits to no other systemic complaints      PHYSICAL EXAM      PSYCH:  Pt is alert, oriented and cooperative    SKIN: warm, dry, w/o lesion    HEAD AND FACE:  External inspection of eyes, ears, functional cranial nerves normal and intact    THYROID:  No thyromegaly    CARDIOVASCULAR:  Warm and well Perfused    PULMONARY:  No respiratory distress    ABDOMEN:  soft, nontender.  No mass or organomegaly.      PELVIC:    External genitalia, urethra, perianal region normal to inspection and palpation if indicated.  No inguinal LA    Vagina without lesions.    Cervix seen and visually normal  String seen, 2 cm.    Bimanual exam:      No pelvic mass palpable    Uterus nontender, midline in pelvis    No adnexal masses or tenderness      Assessment/Plan    Diagnoses and all orders for this visit:  IUD check up -clinically, string is length we trimmed it.  Check pelvic ultrasound for documentation of location of IUD.  No evidence of prolapse.  -     US pelvis; Future    Patient is planning pregnancy in the future via natural conception.  Would accept and plan [facility] for diagnosis of potential SMA.  Discussed another preconception visit with  if needed.

## 2024-07-02 DIAGNOSIS — O14.92: Primary | ICD-10-CM

## 2024-07-05 ENCOUNTER — HOSPITAL ENCOUNTER (OUTPATIENT)
Dept: RADIOLOGY | Facility: CLINIC | Age: 34
Discharge: HOME | End: 2024-07-05
Payer: COMMERCIAL

## 2024-07-05 DIAGNOSIS — Z30.431 IUD CHECK UP: ICD-10-CM

## 2024-08-21 ENCOUNTER — TELEPHONE (OUTPATIENT)
Dept: OBSTETRICS AND GYNECOLOGY | Facility: CLINIC | Age: 34
End: 2024-08-21
Payer: COMMERCIAL

## 2024-09-10 DIAGNOSIS — F32.A DEPRESSION, UNSPECIFIED: ICD-10-CM

## 2024-09-10 DIAGNOSIS — F41.9 ANXIETY DISORDER, UNSPECIFIED: ICD-10-CM

## 2024-09-10 RX ORDER — SERTRALINE HYDROCHLORIDE 100 MG/1
100 TABLET, FILM COATED ORAL DAILY
Qty: 90 TABLET | Refills: 3 | Status: SHIPPED | OUTPATIENT
Start: 2024-09-10

## 2024-10-16 NOTE — PROGRESS NOTES
10/17/2024   Adrienne Cedeño     [facility] CONSULT NOTE  Referring Clinician: Dr. Huynh  Reason for consult: history of HELLP syndrome     HPI: Adrienne Cedeño is a 34 y.o.  here for preconception consult given history of previable HELLP syndrome and SMA carrier.    Patient reports feeling anxious. Reports wanting to learn if it is safe for her to try and get pregnant again. Concerned about her risk of HELLP syndrome.     Denies history of joint pain, facial rash, or hair loss or other signs of lupus.    History notable for:   pregnancy affected by SMA, patient underwent elective termination   IVF pregnancy, developed HELLP syndrome which progressed to DIC, ultimately underwent D&E, APLS labs  neg  Depression/Anxiety, on Zoloft 100mg daily    10 point review of system is negative except as above    OB History          2    Para   0    Term   0       0    AB   2    Living   0         SAB   0    IAB   2    Ectopic   0    Multiple   0    Live Births   0               Past medical history: Denies DM, asthma, depression, or thyroid issues    Past Surgical History:   Procedure Laterality Date    OTHER SURGICAL HISTORY  10/15/2020    Corneal lasik    OTHER SURGICAL HISTORY  2022    In vitro fertilization    OTHER SURGICAL HISTORY  08/10/2021    Dilation and evacuation       Medications:     Current Outpatient Medications   Medication Instructions    levonorgestreL (Liletta) 20.4 mcg/24 hrs (8 yrs) 52 mg intrauterine device intrauterine    multivit 47-iron-folate 1-dha (PNV-DHA) 27 mg iron-1 mg -300 mg capsule Daily    sertraline (ZOLOFT) 100 mg, oral, Daily        Allergies   Allergen Reactions    Amoxicillin Hives       Social History     Tobacco Use    Smoking status: Never    Smokeless tobacco: Never   Substance Use Topics    Alcohol use: Yes     Alcohol/week: 0.0 - 1.0 standard drinks of alcohol    Drug use: Never       family history includes Breast cancer in her maternal grandmother  and mother's sister; Cancer in her mother's sister; Coronary artery disease in her paternal grandfather; Depression in her father, mother, and sister; Heart disease in her father, paternal grandfather, and paternal grandmother; Hypertension in her father.      OBJECTIVE  Visit Vitals  BP (!) 132/91   Wt 95.2 kg (209 lb 12.8 oz)   BMI 34.91 kg/m²   OB Status Having periods   Smoking Status Never   BSA 2.09 m²     Physical exam  General: no acute distress  HEENT: normocephalic, atraumatic  Heart: warm and well perfused  Lungs: breathing comfortably on room air  Abdomen: soft, non-distended  Extremities: moving all extremities  Neuro: awake and conversant  Psych: appropriate mood and affect    ASSESSMENT & PLAN    Adrienne Cedeño is a 34 y.o.  here for preconception counseling in setting of the following:    Assessment & Plan  Anxiety and depression  We reviewed the implications of treatment for depression in pregnancy and that generally the benefits of treated depression outweigh potential fetal risks associated with medication use.  We discussed that untreated depression in pregnancy carries with it risks of poor compliance with prenatal care, poor nutrition, low birth weight,  fetal growth, and disruptions in mother-infant bonding.  We did review that antidepressant medication is considered safe in pregnancy with the majority of data pertaining to SSRIs. While there is some potential for  withdrawal this is generally mild.  Given its safety profile, Zoloft is often considered as a first line medication though SSRIs may be used (paroxetine should be avoided due to its association with cardiac defects).      HELLP syndrome, complicating childbirth ([facility])  In review of her prior pregnancies she was diagnosed at 19 weeks gestation with HELLP syndrome.      Discussed some of the risk factors for pre-eclampsia. We discussed that the recurrence rate of pre-eclampsia in a subsequent pregnancy is  ~15-48%, though the rate can be higher for those with severe disease and/or presented at earlier gestational ages and given the extent of her disease the literature has shown range of 12 - 43%. We discussed that the use of low dose aspirin starting at 10 - 12 weeks gestation has been demonstrated to reduce the risk of recurrence in women at high risk.    She has a history of negative APLS testing however given her history can consider Lovenox ppx during pregnancy. Discussed risks/benefits and lack of data for benefits of use of Lovenox ppx in her clinical scenario. Will readdress during pregnancy.     Otherwise there is insufficient data to recommend other prevention techniques and any subsequent pregnancy should be managed with close observation and baseline pre-eclampsia laboratory evaluation.  We did discuss the association of preeclampsia in a prior pregnancy with the development of fetal growth restriction in a subsequent pregnancy and would recommend assessment of fetal growth by ultrasound in the third trimester. Finally, we also discussed the increased lifelong risk in cardiovascular disease including CAD, stroke, heart failure in women with a history of pre-eclampsia and the recommendation for regular health screening as well as maintaining healthy weight, adopting a diet high in fiber, vegetables and fruit and low in fat, engaging in regular aerobic exercise, and avoiding tobacco.    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 Her  subsequently underwent carrier screening for both conditions. He was found to be a carrier of SMA (SMN1 1 copy) and screened negative for cystic fibrosis.     When both parents are carriers in each pregnancy, there is a 25% (1 in 4) chance of having a child with SMA, a 50% (2 in 4) chance of having a child who is a  carrier, and a 25% (1 in 4) chance of having a child who is not affected and not a carrier.     Will refer to genetic counselor.  Ds DNA antibody positive  Patient with positive dsDNA Ab in , was seen by rhematology in   Denies any other systemic symptoms of lupus however given specificity of dsDNA Ab, will repeat C3/C4, GUILLE, dsDNA Ab, SSA, SSB, CBC, CMP, p:c  Risk of adverse  outcomes discussed with patient including preeclampsia, FGR, IUFD,  delivery discussed with the patient.    We recommend continued follow-up with her Rheumatologist during this pregnancy if any abnormal values noted   Recommend starting [id number] for preeclampsia risk reduction as above.  Elevated blood pressure reading without diagnosis of hypertension  /91 in office today  Patient taking BP at home and reports in 120s/80s  States that was very anxious about appointment today    RTC during pregnancy.    Seen and discussed Dr. Taina Anaya MD PGY-3  Maternal Fetal Medicine

## 2024-10-17 ENCOUNTER — LAB (OUTPATIENT)
Dept: LAB | Facility: LAB | Age: 34
End: 2024-10-17
Payer: COMMERCIAL

## 2024-10-17 ENCOUNTER — INITIAL PRENATAL (OUTPATIENT)
Dept: MATERNAL FETAL MEDICINE | Facility: CLINIC | Age: 34
End: 2024-10-17
Payer: COMMERCIAL

## 2024-10-17 VITALS — WEIGHT: 209.8 LBS | DIASTOLIC BLOOD PRESSURE: 91 MMHG | BODY MASS INDEX: 34.91 KG/M2 | SYSTOLIC BLOOD PRESSURE: 132 MMHG

## 2024-10-17 DIAGNOSIS — Z14.8 CARRIER OF GENETIC DISORDER: ICD-10-CM

## 2024-10-17 DIAGNOSIS — M32.9 SYSTEMIC LUPUS ERYTHEMATOSUS, UNSPECIFIED SLE TYPE, UNSPECIFIED ORGAN INVOLVEMENT STATUS (MULTI): ICD-10-CM

## 2024-10-17 DIAGNOSIS — O14.92: ICD-10-CM

## 2024-10-17 DIAGNOSIS — R03.0 ELEVATED BLOOD PRESSURE READING WITHOUT DIAGNOSIS OF HYPERTENSION: ICD-10-CM

## 2024-10-17 DIAGNOSIS — F41.9 ANXIETY AND DEPRESSION: ICD-10-CM

## 2024-10-17 DIAGNOSIS — F32.A ANXIETY AND DEPRESSION: ICD-10-CM

## 2024-10-17 DIAGNOSIS — Z31.69 ENCOUNTER FOR PRECONCEPTION CONSULTATION: Primary | ICD-10-CM

## 2024-10-17 DIAGNOSIS — R76.8 DS DNA ANTIBODY POSITIVE: ICD-10-CM

## 2024-10-17 LAB
ALBUMIN SERPL BCP-MCNC: 4.6 G/DL (ref 3.4–5)
ALP SERPL-CCNC: 88 U/L (ref 33–110)
ALT SERPL W P-5'-P-CCNC: 10 U/L (ref 7–45)
ANION GAP SERPL CALC-SCNC: 13 MMOL/L (ref 10–20)
AST SERPL W P-5'-P-CCNC: 14 U/L (ref 9–39)
BILIRUB SERPL-MCNC: 1.4 MG/DL (ref 0–1.2)
BUN SERPL-MCNC: 15 MG/DL (ref 6–23)
C3 SERPL-MCNC: 127 MG/DL (ref 87–200)
C4 SERPL-MCNC: 25 MG/DL (ref 10–50)
CALCIUM SERPL-MCNC: 9.5 MG/DL (ref 8.6–10.6)
CHLORIDE SERPL-SCNC: 107 MMOL/L (ref 98–107)
CO2 SERPL-SCNC: 25 MMOL/L (ref 21–32)
CREAT SERPL-MCNC: 0.99 MG/DL (ref 0.5–1.05)
CREAT UR-MCNC: 236.9 MG/DL (ref 20–320)
DSDNA AB SER-ACNC: 11 IU/ML
EGFRCR SERPLBLD CKD-EPI 2021: 77 ML/MIN/1.73M*2
ENA SS-A AB SER IA-ACNC: 0.2 AI
ENA SS-B AB SER IA-ACNC: <0.2 AI
ERYTHROCYTE [DISTWIDTH] IN BLOOD BY AUTOMATED COUNT: 12.2 % (ref 11.5–14.5)
GLUCOSE SERPL-MCNC: 104 MG/DL (ref 74–99)
HCT VFR BLD AUTO: 43.9 % (ref 36–46)
HGB BLD-MCNC: 14.4 G/DL (ref 12–16)
MCH RBC QN AUTO: 29.9 PG (ref 26–34)
MCHC RBC AUTO-ENTMCNC: 32.8 G/DL (ref 32–36)
MCV RBC AUTO: 91 FL (ref 80–100)
NRBC BLD-RTO: 0 /100 WBCS (ref 0–0)
PLATELET # BLD AUTO: 323 X10*3/UL (ref 150–450)
POTASSIUM SERPL-SCNC: 4.5 MMOL/L (ref 3.5–5.3)
PROT SERPL-MCNC: 7.4 G/DL (ref 6.4–8.2)
PROT UR-ACNC: 9 MG/DL (ref 5–24)
PROT/CREAT UR: 0.04 MG/MG CREAT (ref 0–0.17)
RBC # BLD AUTO: 4.82 X10*6/UL (ref 4–5.2)
SODIUM SERPL-SCNC: 140 MMOL/L (ref 136–145)
WBC # BLD AUTO: 10.7 X10*3/UL (ref 4.4–11.3)

## 2024-10-17 PROCEDURE — 99215 OFFICE O/P EST HI 40 MIN: CPT | Performed by: OBSTETRICS & GYNECOLOGY

## 2024-10-17 PROCEDURE — 86235 NUCLEAR ANTIGEN ANTIBODY: CPT

## 2024-10-17 PROCEDURE — 84156 ASSAY OF PROTEIN URINE: CPT | Performed by: OBSTETRICS & GYNECOLOGY

## 2024-10-17 PROCEDURE — 86225 DNA ANTIBODY NATIVE: CPT

## 2024-10-17 PROCEDURE — 99417 PROLNG OP E/M EACH 15 MIN: CPT | Performed by: OBSTETRICS & GYNECOLOGY

## 2024-10-17 PROCEDURE — 85027 COMPLETE CBC AUTOMATED: CPT

## 2024-10-17 PROCEDURE — 86038 ANTINUCLEAR ANTIBODIES: CPT

## 2024-10-17 PROCEDURE — 86160 COMPLEMENT ANTIGEN: CPT

## 2024-10-17 PROCEDURE — 80053 COMPREHEN METABOLIC PANEL: CPT

## 2024-10-17 PROCEDURE — 36415 COLL VENOUS BLD VENIPUNCTURE: CPT

## 2024-10-17 ASSESSMENT — ENCOUNTER SYMPTOMS
NEUROLOGICAL NEGATIVE: 0
EYES NEGATIVE: 0
ENDOCRINE NEGATIVE: 0
RESPIRATORY NEGATIVE: 0
HEMATOLOGIC/LYMPHATIC NEGATIVE: 0
CONSTITUTIONAL NEGATIVE: 0
GASTROINTESTINAL NEGATIVE: 0
ALLERGIC/IMMUNOLOGIC NEGATIVE: 0
MUSCULOSKELETAL NEGATIVE: 0
CARDIOVASCULAR NEGATIVE: 0
PSYCHIATRIC NEGATIVE: 0

## 2024-10-17 ASSESSMENT — PATIENT HEALTH QUESTIONNAIRE - PHQ9
2. FEELING DOWN, DEPRESSED OR HOPELESS: NOT AT ALL
1. LITTLE INTEREST OR PLEASURE IN DOING THINGS: NOT AT ALL
SUM OF ALL RESPONSES TO PHQ9 QUESTIONS 1 AND 2: 0

## 2024-10-17 ASSESSMENT — COLUMBIA-SUICIDE SEVERITY RATING SCALE - C-SSRS
6. HAVE YOU EVER DONE ANYTHING, STARTED TO DO ANYTHING, OR PREPARED TO DO ANYTHING TO END YOUR LIFE?: NO
2. HAVE YOU ACTUALLY HAD ANY THOUGHTS OF KILLING YOURSELF?: NO
1. IN THE PAST MONTH, HAVE YOU WISHED YOU WERE DEAD OR WISHED YOU COULD GO TO SLEEP AND NOT WAKE UP?: NO

## 2024-10-17 NOTE — ASSESSMENT & PLAN NOTE
Patient with positive dsDNA Ab in , was seen by rhematology in   Denies any other systemic symptoms of lupus however given specificity of dsDNA Ab, will repeat C3/C4, GUILLE, dsDNA Ab, SSA, SSB, CBC, CMP, p:c  Risk of adverse  outcomes discussed with patient including preeclampsia, FGR, IUFD,  delivery discussed with the patient.    We recommend continued follow-up with her Rheumatologist during this pregnancy if any abnormal values noted   Recommend starting [id number] for preeclampsia risk reduction as above.

## 2024-10-17 NOTE — ASSESSMENT & PLAN NOTE
In review of her prior pregnancies she was diagnosed at 19 weeks gestation with HELLP syndrome.      Discussed some of the risk factors for pre-eclampsia. We discussed that the recurrence rate of pre-eclampsia in a subsequent pregnancy is ~15-48%, though the rate can be higher for those with severe disease and/or presented at earlier gestational ages and given the extent of her disease the literature has shown range of 12 - 43%. We discussed that the use of low dose aspirin starting at 10 - 12 weeks gestation has been demonstrated to reduce the risk of recurrence in women at high risk.    She has a history of negative APLS testing however given her history can consider Lovenox ppx during pregnancy. Discussed risks/benefits and lack of data for benefits of use of Lovenox ppx in her clinical scenario. Will readdress during pregnancy.     Otherwise there is insufficient data to recommend other prevention techniques and any subsequent pregnancy should be managed with close observation and baseline pre-eclampsia laboratory evaluation.  We did discuss the association of preeclampsia in a prior pregnancy with the development of fetal growth restriction in a subsequent pregnancy and would recommend assessment of fetal growth by ultrasound in the third trimester. Finally, we also discussed the increased lifelong risk in cardiovascular disease including CAD, stroke, heart failure in women with a history of pre-eclampsia and the recommendation for regular health screening as well as maintaining healthy weight, adopting a diet high in fiber, vegetables and fruit and low in fat, engaging in regular aerobic exercise, and avoiding tobacco.

## 2024-10-17 NOTE — ASSESSMENT & PLAN NOTE
Diagnosed by positive dsDNA in   Last seen by rhematology in   Risk of adverse  outcomes discussed with patient including preeclampsia, FGR (12.6%), IUFD (3.6%) ,  delivery discussed with the patient.   Fetuses of mothers with +SSA/SSB antibodies are at an increased risk of congenital heart block (0.7-2%) and we recommend a Fetal echo for evaluation. Fetuses with congenital heart block have a mortality rate of up to 30%. Although there is no clear intervention for patients diagnosed with fetal heart block we recommend a fetal echo at 18-20 weeks to allow patient to be enrolled in clinical trials.  Medications that are safe for use in pregnancy include hydroxychloroquine, steroids, and calcineurin inhibitors. Azathioprine may be used as a second line agent as a maximum dose of 2mg/kg/day if needed. *** Biologics*** Recommend against using mycophenolate mofetil (MMF), cyclophosphamide, and methotrexate.  - Given increased risk of preeclampsia, recommend baseline preeclampsia labs, complement levels, and ESR  We recommend continued follow-up with her Rheumatologist during this pregnancy  Recommend starting [id number] for preeclampsia risk reduction

## 2024-10-17 NOTE — ASSESSMENT & PLAN NOTE
In prior pregnancy, patient underwent genetic screening and She was found to be a carrier of both cystic fibrosis (A374wwh) and spinal muscular atrophy (SMN1 1 copy). Her  subsequently underwent carrier screening for both conditions. He was found to be a carrier of SMA (SMN1 1 copy) and screened negative for cystic fibrosis.     When both parents are carriers in each pregnancy, there is a 25% (1 in 4) chance of having a child with SMA, a 50% (2 in 4) chance of having a child who is a carrier, and a 25% (1 in 4) chance of having a child who is not affected and not a carrier.     Will refer to genetic counselor.

## 2024-10-17 NOTE — ASSESSMENT & PLAN NOTE
We reviewed the implications of treatment for depression in pregnancy and that generally the benefits of treated depression outweigh potential fetal risks associated with medication use.  We discussed that untreated depression in pregnancy carries with it risks of poor compliance with prenatal care, poor nutrition, low birth weight,  fetal growth, and disruptions in mother-infant bonding.  We did review that antidepressant medication is considered safe in pregnancy with the majority of data pertaining to SSRIs. While there is some potential for  withdrawal this is generally mild.  Given its safety profile, Zoloft is often considered as a first line medication though SSRIs may be used (paroxetine should be avoided due to its association with cardiac defects).

## 2024-10-18 LAB
ANA PATTERN: ABNORMAL
ANA SER QL HEP2 SUBST: POSITIVE
ANA TITR SER IF: ABNORMAL {TITER}
CENTROMERE B AB SER-ACNC: <0.2 AI
CHROMATIN AB SERPL-ACNC: 0.4 AI
DSDNA AB SER-ACNC: 12 IU/ML
ENA JO1 AB SER QL IA: <0.2 AI
ENA RNP AB SER IA-ACNC: <0.2 AI
ENA SCL70 AB SER QL IA: <0.2 AI
ENA SM AB SER IA-ACNC: <0.2 AI
ENA SM+RNP AB SER QL IA: <0.2 AI
ENA SS-A AB SER IA-ACNC: 0.2 AI
ENA SS-B AB SER IA-ACNC: <0.2 AI
RIBOSOMAL P AB SER-ACNC: 0.2 AI

## 2024-10-29 DIAGNOSIS — R76.8 DS DNA ANTIBODY POSITIVE: Primary | ICD-10-CM

## 2024-11-11 ASSESSMENT — RHEUMATOLOGY NEW PATIENT QUESTIONNAIRE
SHORTNESS OF BREATH: N
COUGH: N
RASH OR ULCERS: N
DIFFICULTY SWALLOWING: N
ANXIETY: Y
LOSS OF VISION: N
ANEMIA: N
PAIN OR BURNING ON URINATION: N
MORNING STIFFNESS: N
FEVER: N
SEIZURES: N
MUSCLE WEAKNESS: N
NUMBNESS OR TINGLING IN HANDS OR FEET: N
VAGINAL DRYNESS: N
EASILY LOSING TEMPER: N
ABNORMAL URINE: N
SWOLLEN OR TENDER GLANDS: N
FAINTING: N
HOARSE VOICE: N
SKIN REDNESS: N
LOSS OF CONSCIOUSNESS: N
EASY BRUISING: N
INCREASED SUSCEPTIBILITY TO INFECTION: N
MEMORY LOSS: N
NODULES/BUMPS: N
JAUNDICE: N
EYE REDNESS: N
DOUBLE OR BLURRED VISION: N
PERSISTENT DIARRHEA: N
DIFFICULTY STAYING ASLEEP: N
NAUSEA: N
UNUSUAL BLEEDING: N
EXCESSIVE HAIR LOSS (MORE THAN YOUR NORM): N
UNEXPLAINED HEARING LOSS: N
DEPRESSION: N
BEHAVIORAL CHANGES: N
UNEXPLAINED WEIGHT CHANGE: N
HEARTBURN OR REFLUX: N
COLOR CHANGES OF HANDS OR FEET IN THE COLD: N
SUN SENSITIVE (SUN ALLERGY): N
RASH: N
SORES IN MOUTH OR NOSE: N
BLACK STOOLS: N
DIFFICULTY BREATHING LYING DOWN: N
CHEST PAIN: N
DRYNESS OF MOUTH: N
JOINT SWELLING: N
EYE DRYNESS: N
SWOLLEN LEGS OR FEET: N
BLOOD IN STOOLS: N
JOINT PAIN: N
HEADACHES: N
AGITATION: N
SKIN TIGHTNESS: N
VOMITING OF BLOOD OR COFFEE GROUND CONSISTENCY MATERIAL: N
NIGHT SWEATS: N
DIFFICULTY FALLING ASLEEP: N
UNUSUAL FATIGUE: N
EYE PAIN: N
UNUSUALLY RAPID OR SLOWED HEART RATE: N
STOMACH PAIN: N

## 2024-11-12 ENCOUNTER — APPOINTMENT (OUTPATIENT)
Dept: RHEUMATOLOGY | Facility: CLINIC | Age: 34
End: 2024-11-12
Payer: COMMERCIAL

## 2024-11-12 VITALS
BODY MASS INDEX: 34.99 KG/M2 | HEART RATE: 112 BPM | DIASTOLIC BLOOD PRESSURE: 73 MMHG | SYSTOLIC BLOOD PRESSURE: 128 MMHG | HEIGHT: 65 IN | WEIGHT: 210 LBS | TEMPERATURE: 98.2 F

## 2024-11-12 DIAGNOSIS — R76.8 POSITIVE ANA (ANTINUCLEAR ANTIBODY): ICD-10-CM

## 2024-11-12 DIAGNOSIS — R76.8 DS DNA ANTIBODY POSITIVE: ICD-10-CM

## 2024-11-12 DIAGNOSIS — N96 HISTORY OF RECURRENT MISCARRIAGES: Primary | ICD-10-CM

## 2024-11-12 PROCEDURE — 3078F DIAST BP <80 MM HG: CPT | Performed by: INTERNAL MEDICINE

## 2024-11-12 PROCEDURE — 1036F TOBACCO NON-USER: CPT | Performed by: INTERNAL MEDICINE

## 2024-11-12 PROCEDURE — 3074F SYST BP LT 130 MM HG: CPT | Performed by: INTERNAL MEDICINE

## 2024-11-12 PROCEDURE — 3008F BODY MASS INDEX DOCD: CPT | Performed by: INTERNAL MEDICINE

## 2024-11-12 PROCEDURE — 99204 OFFICE O/P NEW MOD 45 MIN: CPT | Performed by: INTERNAL MEDICINE

## 2024-11-12 ASSESSMENT — PAIN SCALES - GENERAL: PAINLEVEL_OUTOF10: 0-NO PAIN

## 2024-11-13 ENCOUNTER — LAB (OUTPATIENT)
Dept: LAB | Facility: LAB | Age: 34
End: 2024-11-13
Payer: COMMERCIAL

## 2024-11-13 DIAGNOSIS — R76.8 DS DNA ANTIBODY POSITIVE: ICD-10-CM

## 2024-11-13 DIAGNOSIS — N96 HISTORY OF RECURRENT MISCARRIAGES: ICD-10-CM

## 2024-11-13 LAB
CRP SERPL-MCNC: 0.24 MG/DL
ERYTHROCYTE [SEDIMENTATION RATE] IN BLOOD BY WESTERGREN METHOD: 8 MM/H (ref 0–20)

## 2024-11-13 PROCEDURE — 85652 RBC SED RATE AUTOMATED: CPT

## 2024-11-13 PROCEDURE — 86140 C-REACTIVE PROTEIN: CPT

## 2024-11-13 PROCEDURE — 85613 RUSSELL VIPER VENOM DILUTED: CPT

## 2024-11-13 PROCEDURE — 86146 BETA-2 GLYCOPROTEIN ANTIBODY: CPT

## 2024-11-13 PROCEDURE — 86147 CARDIOLIPIN ANTIBODY EA IG: CPT

## 2024-11-13 PROCEDURE — 36415 COLL VENOUS BLD VENIPUNCTURE: CPT

## 2024-11-13 NOTE — PROGRESS NOTES
Subjective   Patient ID: Adrienne Cedeño is a 34 y.o. female who presents for New Patient Visit (New patient. Follow up for lab results.).    HPI  35 yo female with positive GUILLE  She is planning a pregnancy  She had preeclampsia during her second pregnancy  Also, she has h/o miscarriage  She had IVF and then HELPP syndrome developed in her second pregnancy  No thyroid disease  No thrombosis  She denies any joint pain, skin rashes, oral ulcer, hair loss  Also, she denies dry eyes, dry mouth, Raynaud  Her grand aunt had lupus, but many years ago  Her tests showed positive GUILLE 1/160  JASPER neg, SSA, SSB neg  dsDNA 22  ROS  Joint pain in hands: negative   Joint swelling: negative  Morning stiffness and duration: negative   strength: normal  Oral ulcer: negative  Genital ulcer: negative  Raynaud phenomenon: negative  Chest pain/dyspnea: negative  Low back pain: negative  Visual problem: negative  Dry eyes/dry mouth: negative  Skin rash/scaling/psoriasis: negative       Objective     PEXAM  VS reviewed, WNL  General: Alert, no distress   HEENT: Normocephalic/atraumatic, No alopecia. PERRLA. Sclera white, conjunctiva pink, no malar rash. no oral or nasal ulcer. Oral cavity pink and moist, no erythema or exudate, dentition good.   Neck: supple  Respiratory: CTA B, no adventitious breath sounds  Cardiac: RRR, no murmurs, carotid, or bruits  Abdominal: symmetrical, soft, non-tender, non-distended, normoactive BSx4 quadrants, no CVA tenderness or suprapubic tenderness  MSK: Joints of upper and lower extremities were assessed for synovitis and ROM.    Today she has no evidence of synovitis in the joints of her hands or wrists, tender joint count 0, swollen joint count 0   Extremities: no clubbing, no cyanosis, no edema  Skin: Skin warm and moist.   Neuro: non-focal, Strength 5/5 throughout. Normal gait. No cerebellar pathologic exam     Assessment/Plan   35 yo female with positive GUILLE  She has h/o miscarriages and  preeclampsia, HELLP syndrome  No thrombosis  Denies any SLE, CTDs findings  PExam did not reveal any rashes or arthritis  -will see her C3, C4, and APS antibodies

## 2024-11-14 LAB
DRVVT SCREEN TO CONFIRM RATIO: 1.29 RATIO
DRVVT/DRVVT CFM NRMLZD PPP-RTO: 1.05 RATIO
DRVVT/DRVVT CFM P DOAC NEUT NORM PPP-RTO: 1.23 RATIO

## 2024-11-15 LAB
B2 GLYCOPROT1 IGA SER-ACNC: <0.6 U/ML
B2 GLYCOPROT1 IGG SER-ACNC: <1.4 U/ML
B2 GLYCOPROT1 IGM SER-ACNC: 1.1 U/ML
CARDIOLIPIN IGA SERPL-ACNC: 0.6 APL U/ML
CARDIOLIPIN IGG SER IA-ACNC: <1.6 GPL U/ML
CARDIOLIPIN IGM SER IA-ACNC: 1.2 MPL U/ML

## 2024-11-21 ENCOUNTER — TELEMEDICINE CLINICAL SUPPORT (OUTPATIENT)
Dept: GENETICS | Facility: CLINIC | Age: 34
End: 2024-11-21
Payer: COMMERCIAL

## 2024-11-21 DIAGNOSIS — Z31.5 ENCOUNTER FOR PROCREATIVE GENETIC COUNSELING: ICD-10-CM

## 2024-11-21 DIAGNOSIS — Z14.8 CARRIER OF GENETIC DISORDER: ICD-10-CM

## 2024-11-21 DIAGNOSIS — Z14.8 CARRIER OF SPINAL MUSCULAR ATROPHY: ICD-10-CM

## 2024-11-21 PROCEDURE — 96040 PR MEDICAL GENETICS COUNSELING EACH 30 MINUTES: CPT

## 2024-11-21 PROCEDURE — 96040 HC GENETIC COUNSELING, EACH 30 MIN: CPT | Mod: 95

## 2024-11-26 NOTE — PROGRESS NOTES
Thank you for the referral of Ms. Adrienne Cedeño. she is a 34 y.o.  female who was seen for genetic counseling due to both her and her partner both being carriers of spinal muscular atrophy.    PAST HISTORY:   Patient is not currently pregnant; she and her partner have had 2 prior pregnancies:  -During their first pregnancy, they were both identified as being carriers for SMA. Patient had an amniocentesis at the time which was positive for the fetus being affected, and she proceeded with termination of pregnancy.     Their second pregnancy was conceived with in vitro fertilization and preimplantation genetic testing for monogenic disease, negative for SMA. This was done at an outside institution. During that pregnancy, she developed HELLP syndrome which progressed to DIC and she ultimately had a D&E. She was previously counseled on risk of HELLP syndrome in future pregnancies by Dr. Benito Her MD (Maternal Fetal Medicine).     Ms. 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 Her  was reportedly negative for everything except SMA.    Patient has ongoing follow up with rheumatology due to increased risk for lupus.     FAMILY HISTORY  Medical and family histories were briefly reviewed based on the pedigree on file from the patient's genetic counseling appointment with SERGIO Alvarez (2021). No significant changes to the family history have been made since that time.    COUNSELING:    The following information was discussed with your patient:    Ms. Cedeño and her partner are both carriers for spinal muscular atrophy and have been counseled on this finding several times. She is aware of the 25% chance of having an affected pregnancy in the future.   We discussed the following options for screening for spinal muscular atrophy in a  pregnancy:  Preimplantation genetic testing for monogenic disease (PGT-M) on embryos obtained through IVF; Ms. Cedeño stated that she had undergone this process before, and due to the affects that IVF had on her, she is not interested in this route at this time.  Noninvasive screening through [employer] ([employer]). This is a maternal carrier test for the following conditions/genes: CF, SMA, HBB, HBA1/2 that reflexes to cell free DNA screening on the pregnancy in the event that the the patient is identified as a carrier of one of these conditions to determine if the fetus is at risk to be affected with the condition. The conditions screened for and benefits and limitations of this technology were discussed. Per the website, [facility] detects approximately 98.5% of affected pregnancies with the screening methodology. Although sensitivity and specificity rates are high in limited validation data, the laboratory has not provided our center with clinical outcomes data. As with any prenatal screen, false negatives and false positives are possible. This is not a diagnostic test. Therefore, in the event of an abnormal result, prenatal diagnosis through amniocentesis is recommended to confirm the findings, if confirmation is desired. Results take approximately 3 weeks.  Diagnostic procedures:  The availability of diagnostic fetal analysis via chorionic villus sampling. The methods, benefits, limitations and risks of CVS including an approximate 1 in 200 risk of complications, including a 1 in 400 risk for miscarriage. This test can be done from 10-14 weeks of a pregnancy. The 0.1-1% risk of confined placental mosaicism in CVS was discussed. Molecular testing of the SMN1 gene causative for SMA is available on a CVS sample.  The availability of diagnostic fetal analysis via amniocentesis. The methods, benefits, limitations and risks of amniocentesis and a 1 in 400 risk of complications, including a 1 in 800 risk of miscarriage.  This test can be done from about 16 weeks of a pregnancy until delivery.  Some individuals with fetuses at risk for genetic conditions choose to proceed with stepwise screening; for example, we could do the non invasive testing option through [facility] as early as 10 weeks gestation, and confirm with a diagnostic test depending on how those results return.  It is recommended for all pregnancies to consider chromosome screening. We discussed that this can also be done via a blood test as early as 10 weeks gestation, or, on any sample obtained from a diagnostic procedure.   All babies born in the [facility] are screened for SMA at birth via the [place] [employer] screen.   We briefly reviewed the option of gene therapy, as was recently discussed with the patient per Vibha Aguilar's 2021 note. We discussed that while more individuals are being treated with gene therapy, it will likely be a long time before we fully understand benefits and risks of this treatment. We also discussed the costs often associated with gene therapy.  Carrier screening for both the patient's siblings and her partner's siblings is recommended if they plan on having future children. Each sibling of a carrier has a 50% chance of also being a carrier for the same condition.     DISPOSITION:  The patient stated that she understood the above information. She is interested in a genetic counseling appointment once she becomes pregnant in the future. We discussed that this can take place as early as she would like so that a plan can be put in place to manage the pregnancy and get genetic information as desired.     [facility] spent 45 minutes with the patient in direct face to face video telecounseling.     Thank you for allowing us to participate in the care of your patient. Should you or your patient have any questions, please do not hesitate to contact our office at [phone].    Sincerely,   Padma [place] MS, [facility]  [place]ensed Genetic  Counselor

## 2024-12-09 ENCOUNTER — TELEMEDICINE (OUTPATIENT)
Dept: BEHAVIORAL HEALTH | Facility: CLINIC | Age: 34
End: 2024-12-09
Payer: COMMERCIAL

## 2024-12-09 DIAGNOSIS — F43.21 ADJUSTMENT DISORDER WITH DEPRESSED MOOD: Primary | ICD-10-CM

## 2024-12-09 PROCEDURE — 90837 PSYTX W PT 60 MINUTES: CPT | Performed by: PSYCHOLOGIST

## 2024-12-10 NOTE — PROGRESS NOTES
"START TIME: 4:05  END TIME: 5  TOTAL TIME FACE TO FACE: 55mins    Subjective   Patient ID: Adrienne Cedeño is a 34 y.o. female who presents for   Problem List Items Addressed This Visit       Adjustment disorder with depressed mood - Primary   .    Virtual or Telephone Consent    An interactive audio and video telecommunication system which permits real time communications between the patient (at the originating site) and provider (at the distant site) was utilized to provide this telehealth service.   Verbal consent was requested and obtained from Adrienne Cedeño on this date, 12/9/24 for a telehealth visit.      TREATMENT: cognitive behavior therapy  Symptoms: anxiety, grief     CONTENT OF SESSION:  This was a followup appointment between provider and patient for individual therapy to address symptoms of trauma and anxiety and grief, associated with reproductive loss.        Adrienne noted, \"there's a lot to unpack\" that occurred over the past few months for her that she wanted to talk through. She had an MFM consult and genetic testing for preconception consult and to discuss possible risks in a future intended pregnancy. She reported overall feeling less fearful of a future intended pregnancy, though acknowledged she would encounter many reminders of past loss/reproductive trauma. She identified some of the triggers she anticipates being most bothersome (e.g., awaiting genetic results). She also noted several family and friends have announced pregnancies recently. She described normative grief reaction and has been able to tolerate emotions triggered by grief. Time was spent reviewing PTSD symptoms vs. Grief symptoms.  Provider also gave supportive counseling and normalized common emotional experiences.     Provider sent a PCL-5 via [employer] this visit to re-assess Adrienne's potential PTSD symptoms especially in anticipation of future potential pregnancy.    Objective   Social History     Substance and Sexual " Activity   Alcohol Use Yes    Alcohol/week: 0.0 - 1.0 standard drinks of alcohol      Social History     Social History Narrative    Not on file        Assessment/Plan   Problem List Items Addressed This Visit       Adjustment disorder with depressed mood - Primary     Plan: plan made for individual CBT to address grief and posttraumatic stress symptoms r/o PTSD and r/o generalized anxiety disorder.    PRACTICE EXERCISES PROVIDED CUMULATIVELY: read handout on [employer] MAN . read handout provided on sleep hygiene, and practice cognitive defusion as discussed. practice cognitive restructuring and use the thought record form and challenging questions worksheet. read handouts provided on loss, trauma, and ptsd. practice behavioral activation as discussed (read handout). look at website resource provided on PTSD treatment. followup in 2 weeks

## 2024-12-23 ENCOUNTER — TELEMEDICINE (OUTPATIENT)
Dept: BEHAVIORAL HEALTH | Facility: CLINIC | Age: 34
End: 2024-12-23
Payer: COMMERCIAL

## 2024-12-23 DIAGNOSIS — F43.21 ADJUSTMENT DISORDER WITH DEPRESSED MOOD: Primary | ICD-10-CM

## 2024-12-23 PROCEDURE — 90837 PSYTX W PT 60 MINUTES: CPT | Performed by: PSYCHOLOGIST

## 2024-12-24 ENCOUNTER — APPOINTMENT (OUTPATIENT)
Dept: RHEUMATOLOGY | Facility: CLINIC | Age: 34
End: 2024-12-24
Payer: COMMERCIAL

## 2024-12-25 NOTE — PROGRESS NOTES
START TIME: 4:05   END TIME: 5p  TOTAL TIME FACE TO FACE: 55mins    Subjective   Patient ID: Adrienne Cedeño is a 34 y.o. female who presents for   Problem List Items Addressed This Visit       Adjustment disorder with depressed mood - Primary   .    Virtual or Telephone Consent    An interactive audio and video telecommunication system which permits real time communications between the patient (at the originating site) and provider (at the distant site) was utilized to provide this telehealth service.   Verbal consent was requested and obtained from Adrienne Cedeño on this date, 12/23/24 for a telehealth visit.    Pt. Located at home during session; sensitive note documentation requested.    TREATMENT: cognitive behavior therapy  Symptoms: anxiety, grief     CONTENT OF SESSION:  This was a followup appointment between provider and patient for individual therapy to address symptoms of trauma and anxiety and grief, associated with reproductive loss.        Adrienne reported grief reaction in response to reminders of reproductive losses, especially in context of holiday season and in context of managing social reactions to experience of reproductive loss. She also has been providing some emotional support to her sister in law and brother, who experienced miscarriage. Time was spent on emotional processing of these experiences. Adrienne reported effective coping skills to manage normative grief, including acceptance of emotions, cognitive restructuring, and behavioral activation and values guided action. Provider also gave supportive counseling and normalized common emotional experiences.      Adrienne completed the PCL-5 via [employer] on 12/22/24 to re-assess Adrienne's potential PTSD symptoms especially in anticipation of future potential pregnancy: score = 18, which is below the clinical cut point (I.e., negative screen for PTSD).    Objective   Social History     Substance and Sexual Activity   Alcohol Use Yes     Alcohol/week: 0.0 - 1.0 standard drinks of alcohol      Social History     Social History Narrative    Not on file        Assessment/Plan   Problem List Items Addressed This Visit       Adjustment disorder with depressed mood - Primary     Plan: plan made for individual CBT to address grief and posttraumatic stress symptoms r/o PTSD and r/o generalized anxiety disorder.    PRACTICE EXERCISES PROVIDED CUMULATIVELY: read handout on DEAR MAN . read handout provided on sleep hygiene, and practice cognitive defusion as discussed. practice cognitive restructuring and use the thought record form and challenging questions worksheet. read handouts provided on loss, trauma, and ptsd. practice behavioral activation as discussed (read handout). look at website resource provided on PTSD treatment. followup in 2 weeks

## 2025-01-07 ENCOUNTER — APPOINTMENT (OUTPATIENT)
Dept: BEHAVIORAL HEALTH | Facility: CLINIC | Age: 35
End: 2025-01-07
Payer: COMMERCIAL

## 2025-01-07 DIAGNOSIS — F43.21 ADJUSTMENT DISORDER WITH DEPRESSED MOOD: Primary | ICD-10-CM

## 2025-01-07 DIAGNOSIS — F41.9 ANXIETY AND DEPRESSION: ICD-10-CM

## 2025-01-07 DIAGNOSIS — F32.A ANXIETY AND DEPRESSION: ICD-10-CM

## 2025-01-07 PROCEDURE — 90837 PSYTX W PT 60 MINUTES: CPT | Performed by: PSYCHOLOGIST

## 2025-01-08 NOTE — PROGRESS NOTES
START TIME: 4:05   END TIME: 5  TOTAL TIME FACE TO FACE: 55mins    Subjective   Patient ID: Adrienne Cedeño is a 34 y.o. female who presents for   Problem List Items Addressed This Visit       Anxiety and depression    Adjustment disorder with depressed mood - Primary   .    Virtual or Telephone Consent    An interactive audio and video telecommunication system which permits real time communications between the patient (at the originating site) and provider (at the distant site) was utilized to provide this telehealth service.   Verbal consent was requested and obtained from Adrienne Cedeño on this date, 1/7/25 for a telehealth visit.      TREATMENT: cognitive behavior therapy  Symptoms: anxiety, grief     CONTENT OF SESSION:  This was a followup appointment between provider and patient for individual therapy to address symptoms of trauma and anxiety and grief, associated with reproductive loss.        Adrienne reported overall doing well; she described feeling excited and anxious about a future intended pregnancy this year. She identified some worry thoughts associated with fear of experiencing HELLP or SMA or loss again. Provider reviewed psychoeducation on imagery exposure related to past losses and guided positive imagery exposure to future intended pregnancy. Time was spent on emotional processing of Adrienne's fear of living child-free and impact on quality of life.  She identified cognitive restructuring and values guided skills to cope with this anxiety (e.g., she is going on a trip to [place] with her niece next week). Provider also gave supportive counseling and normalized common emotional experiences.        Objective   Social History     Substance and Sexual Activity   Alcohol Use Yes    Alcohol/week: 0.0 - 1.0 standard drinks of alcohol      Social History     Social History Narrative    Not on file        Assessment/Plan   Problem List Items Addressed This Visit       Anxiety and depression    Adjustment  disorder with depressed mood - Primary     Plan: plan made for individual CBT to address grief and posttraumatic stress symptoms r/o PTSD and r/o generalized anxiety disorder.    PRACTICE EXERCISES PROVIDED CUMULATIVELY: read handout on DEAR MAN . read handout provided on sleep hygiene, and practice cognitive defusion as discussed. practice cognitive restructuring and use the thought record form and challenging questions worksheet. read handouts provided on loss, trauma, and ptsd. practice behavioral activation as discussed (read handout). look at website resource provided on PTSD treatment. followup in 2 weeks

## 2025-01-21 ENCOUNTER — APPOINTMENT (OUTPATIENT)
Dept: BEHAVIORAL HEALTH | Facility: CLINIC | Age: 35
End: 2025-01-21
Payer: COMMERCIAL

## 2025-02-04 ENCOUNTER — APPOINTMENT (OUTPATIENT)
Dept: BEHAVIORAL HEALTH | Facility: CLINIC | Age: 35
End: 2025-02-04
Payer: COMMERCIAL

## 2025-02-04 DIAGNOSIS — F41.9 ANXIETY AND DEPRESSION: ICD-10-CM

## 2025-02-04 DIAGNOSIS — F43.21 ADJUSTMENT DISORDER WITH DEPRESSED MOOD: Primary | ICD-10-CM

## 2025-02-04 DIAGNOSIS — F32.A ANXIETY AND DEPRESSION: ICD-10-CM

## 2025-02-04 PROCEDURE — 90837 PSYTX W PT 60 MINUTES: CPT | Performed by: PSYCHOLOGIST

## 2025-02-05 NOTE — PROGRESS NOTES
START TIME: 4:05   END TIME: 5  TOTAL TIME FACE TO FACE: 55mins    Subjective   Patient ID: Adrienne Cedeño is a 34 y.o. female who presents for   Problem List Items Addressed This Visit       Anxiety and depression    Adjustment disorder with depressed mood - Primary   .    Virtual or Telephone Consent    An interactive audio and video telecommunication system which permits real time communications between the patient (at the originating site) and provider (at the distant site) was utilized to provide this telehealth service.   Verbal consent was requested and obtained from Adrienne Cedeño on this date, 2/4/25 for a telehealth visit.      TREATMENT: cognitive behavior therapy  Symptoms: anxiety, grief     CONTENT OF SESSION:  This was a followup appointment between provider and patient for individual therapy to address symptoms of trauma and anxiety and grief, associated with reproductive loss.        Adrienne reported overall good mood. She got a pair of gerbils for her class pet and has been enjoying and feeling a sense of mastery for taking care of them. She noted how this started a discussion with Jonathan about intended future attempt to conceive again. Adrienne also discussed recent trauma/grief trigger when she was watching a [employer] episode that involved termination of pregnancy for medical reasons. She described normative grief response; she denied significant PTSD re-experiencing or avoidance symptoms. Time was spent on emotional processing of her past reproductive losses and managing potential decisions in a future pregnancy. Provider used Socratic questioning to facilitate cognitive restructuring of potential unrealistic or unhelpful cognitions and patient was able to engage in cognitive restructuring. provider also reviewed psychoeducation on common psychological reactions to reproductive loss and trauma. Provider also gave supportive counseling and normalized common emotional experiences.      Objective   Social History     Substance and Sexual Activity   Alcohol Use Yes    Alcohol/week: 0.0 - 1.0 standard drinks of alcohol      Social History     Social History Narrative    Not on file        Assessment/Plan   Problem List Items Addressed This Visit       Anxiety and depression    Adjustment disorder with depressed mood - Primary     Plan: plan made for individual CBT to address grief and posttraumatic stress symptoms r/o PTSD and r/o generalized anxiety disorder.    PRACTICE EXERCISES PROVIDED CUMULATIVELY: read handout on DEAR MAN . read handout provided on sleep hygiene, and practice cognitive defusion as discussed. practice cognitive restructuring and use the thought record form and challenging questions worksheet. read handouts provided on loss, trauma, and ptsd. practice behavioral activation as discussed (read handout). look at website resource provided on PTSD treatment. followup in 2 weeks

## 2025-04-01 ENCOUNTER — APPOINTMENT (OUTPATIENT)
Dept: BEHAVIORAL HEALTH | Facility: CLINIC | Age: 35
End: 2025-04-01
Payer: COMMERCIAL

## 2025-04-01 DIAGNOSIS — F41.9 ANXIETY AND DEPRESSION: ICD-10-CM

## 2025-04-01 DIAGNOSIS — F32.A ANXIETY AND DEPRESSION: ICD-10-CM

## 2025-04-01 DIAGNOSIS — F43.21 ADJUSTMENT DISORDER WITH DEPRESSED MOOD: Primary | ICD-10-CM

## 2025-04-01 PROCEDURE — 90837 PSYTX W PT 60 MINUTES: CPT | Performed by: PSYCHOLOGIST

## 2025-04-02 NOTE — PROGRESS NOTES
"START TIME: 8:05   END TIME: 9  TOTAL TIME FACE TO FACE: 55mins    Subjective   Patient ID: Adrienne Cedeño is a 34 y.o. female who presents for   Problem List Items Addressed This Visit       Anxiety and depression    Adjustment disorder with depressed mood - Primary   .    Virtual or Telephone Consent    An interactive audio and video telecommunication system which permits real time communications between the patient (at the originating site) and provider (at the distant site) was utilized to provide this telehealth service.   Verbal consent was requested and obtained from Adrienne Cedeño on this date, 4/1/25 for a telehealth visit and the patient's location was confirmed at the time of the visit.     TREATMENT: cognitive behavior therapy  Symptoms: anxiety, grief, low mood, worry thoughts     CONTENT OF SESSION:  This was a followup appointment between provider and patient for individual therapy to address symptoms of trauma and anxiety and grief, associated with reproductive loss.        Adrienne reported increase in anxiety and grief symptoms in past few weeks. Her sister disclosed she is pregnant and Adrienne noted how this is triggering reminders of her reproductive loss and an increased sense of isolation and feeling \"left behind.\" Time was spent on identification of potential strategies for 'cope ahead' and setting limits/boundaries with additional reminders such as her sister's baby shower. Provider reviewed psychoeducation on common psychological reactions to reproductive loss and trauma. Adrienne also expressed anxiety in anticipation of getting her IUD removed in two weeks, and associated worry thoughts related to fear of not having a child. Provider used Socratic questioning to facilitate cognitive restructuring of potential unrealistic or unhelpful cognitions and patient was able to engage in cognitive restructuring. Provider also gave supportive counseling and normalized common emotional experiences.    " Next appointment will be the day after Adrienne's IUD removal- plan to discuss her adjustment at that time. She also made plan to discuss potential increase in sertraline with her prescribing provider in anticipation of intended pregnancy.    Objective   Social History     Substance and Sexual Activity   Alcohol Use Yes    Alcohol/week: 0.0 - 1.0 standard drinks of alcohol      Social History     Social History Narrative    Not on file        Assessment/Plan   Problem List Items Addressed This Visit       Anxiety and depression    Adjustment disorder with depressed mood - Primary       Plan: plan made for individual CBT to address grief and posttraumatic stress symptoms r/o PTSD and r/o generalized anxiety disorder.    PRACTICE EXERCISES PROVIDED CUMULATIVELY: read handout on DEAR MAN . read handout provided on sleep hygiene, and practice cognitive defusion as discussed. practice cognitive restructuring and use the thought record form and challenging questions worksheet. read handouts provided on loss, trauma, and ptsd. practice behavioral activation as discussed (read handout). look at website resource provided on PTSD treatment. followup in 2 weeks

## 2025-04-14 ENCOUNTER — APPOINTMENT (OUTPATIENT)
Dept: OBSTETRICS AND GYNECOLOGY | Facility: CLINIC | Age: 35
End: 2025-04-14
Payer: COMMERCIAL

## 2025-04-14 VITALS — BODY MASS INDEX: 36.11 KG/M2 | DIASTOLIC BLOOD PRESSURE: 78 MMHG | SYSTOLIC BLOOD PRESSURE: 118 MMHG | WEIGHT: 217 LBS

## 2025-04-14 DIAGNOSIS — Z30.432 ENCOUNTER FOR IUD REMOVAL: ICD-10-CM

## 2025-04-14 ASSESSMENT — PATIENT HEALTH QUESTIONNAIRE - PHQ9
1. LITTLE INTEREST OR PLEASURE IN DOING THINGS: NOT AT ALL
SUM OF ALL RESPONSES TO PHQ9 QUESTIONS 1 AND 2: 0
2. FEELING DOWN, DEPRESSED OR HOPELESS: NOT AT ALL

## 2025-04-14 NOTE — PROGRESS NOTES
Had preconception visit with Dr. Her in October.  Was referred to rheumatology.  Patient relates low-level positivity for lupus anticoagulant.  Planning natural conception  Given history of very early preeclampsia plan is for aspirin and Lovenox, per patient history.    Patient ID: Adrienne Cedeño is a 34 y.o. female.    IUD Removal    Performed by: Eric Huynh MD  Authorized by: Eric Huynh MD    Procedure: IUD removal    Consent obtained by patient, parent, or legal power of  - including discussion of procedure risks and benefits, patient questions answered, and patient education provided: yes    Reason for removal: patient request    Strings visualized: yes    Cervix cleaned with: iodopovidone    IUD grasped by forceps: yes    IUD removed: yes    Date/Time of Removal:  4/14/2025 5:33 PM  Removed without complications: yes    IUD intact: yes

## 2025-04-15 ENCOUNTER — APPOINTMENT (OUTPATIENT)
Dept: BEHAVIORAL HEALTH | Facility: CLINIC | Age: 35
End: 2025-04-15
Payer: COMMERCIAL

## 2025-04-15 DIAGNOSIS — F41.9 ANXIETY AND DEPRESSION: ICD-10-CM

## 2025-04-15 DIAGNOSIS — F43.21 ADJUSTMENT DISORDER WITH DEPRESSED MOOD: Primary | ICD-10-CM

## 2025-04-15 DIAGNOSIS — F32.A ANXIETY AND DEPRESSION: ICD-10-CM

## 2025-04-15 PROCEDURE — 90837 PSYTX W PT 60 MINUTES: CPT | Performed by: PSYCHOLOGIST

## 2025-04-16 NOTE — PROGRESS NOTES
"START TIME: 4:05   END TIME: 5  TOTAL TIME FACE TO FACE: 55mins    Subjective   Patient ID: Adrienne Cedeño is a 34 y.o. female who presents for   Problem List Items Addressed This Visit       Anxiety and depression    Adjustment disorder with depressed mood - Primary   .    Virtual or Telephone Consent    An interactive audio and video telecommunication system which permits real time communications between the patient (at the originating site) and provider (at the distant site) was utilized to provide this telehealth service.   Verbal consent was requested and obtained from Adrienne Cedeño on this date, 4/15/25 for a telehealth visit and the patient's location was confirmed at the time of the visit.     TREATMENT: cognitive behavior therapy  Symptoms: anxiety, grief, low mood, worry thoughts     CONTENT OF SESSION:  This was a followup appointment between provider and patient for individual therapy to address symptoms of trauma and anxiety and grief, associated with reproductive loss.      Focus of session was on Adrienne's anxiety and trauma-related symptoms (associated with traumatic reproductive loss) in response to anticipating trying to conceive again. She had her IUD removed this week and she expressed feeling supported by her obgyn and m care team.     Adrienne noted feeling like \"there are positive things but it's like waiting for the other show to drop\". Jonathan is waiting for a final offer on a new job that she believes will increase their quality of life.  She acknowledged worry thoughts related to fear of complications in intended pregnancy, and has been engaging in adaptive and appropriate coping skills including cognitive restructuring of worry thoughts, engagement in positive social support, and self-care and limit setting. Provider gave positive feedback about this skill use.  Provider reviewed psychoeducation on common psychological reactions to reproductive trauma and loss and discussed potential ways " to increase feeling of empowerment (e.g., via assertive communication). Provider gave supportive counseling and normalized common emotional experiences.    Objective   Social History     Substance and Sexual Activity   Alcohol Use Yes    Alcohol/week: 0.0 - 1.0 standard drinks of alcohol      Social History     Social History Narrative    Not on file        Assessment/Plan   Problem List Items Addressed This Visit       Anxiety and depression    Adjustment disorder with depressed mood - Primary       Plan: plan made for individual CBT to address grief and posttraumatic stress symptoms r/o PTSD and r/o generalized anxiety disorder.    PRACTICE EXERCISES PROVIDED CUMULATIVELY: read handout on DEAR MAN . read handout provided on sleep hygiene, and practice cognitive defusion as discussed. practice cognitive restructuring and use the thought record form and challenging questions worksheet. read handouts provided on loss, trauma, and ptsd. practice behavioral activation as discussed (read handout). look at website resource provided on PTSD treatment. followup in 2 weeks

## 2025-04-30 ENCOUNTER — APPOINTMENT (OUTPATIENT)
Dept: BEHAVIORAL HEALTH | Facility: CLINIC | Age: 35
End: 2025-04-30
Payer: COMMERCIAL

## 2025-04-30 DIAGNOSIS — F43.21 ADJUSTMENT DISORDER WITH DEPRESSED MOOD: Primary | ICD-10-CM

## 2025-04-30 DIAGNOSIS — F41.9 ANXIETY AND DEPRESSION: ICD-10-CM

## 2025-04-30 DIAGNOSIS — F32.A ANXIETY AND DEPRESSION: ICD-10-CM

## 2025-04-30 PROCEDURE — 90837 PSYTX W PT 60 MINUTES: CPT | Performed by: PSYCHOLOGIST

## 2025-04-30 NOTE — PROGRESS NOTES
"START TIME: 4:05   END TIME: 5  TOTAL TIME FACE TO FACE: 55min    Subjective   Patient ID: Adrienne Cedeño is a 34 y.o. female who presents for   Problem List Items Addressed This Visit       Anxiety and depression    Adjustment disorder with depressed mood - Primary   .    Virtual or Telephone Consent    An interactive audio and video telecommunication system which permits real time communications between the patient (at the originating site) and provider (at the distant site) was utilized to provide this telehealth service.   Verbal consent was requested and obtained from Adrienne Cedeño on this date, 05/01/25 for a telehealth visit and the patient's location was confirmed at the time of the visit.     TREATMENT: cognitive behavior therapy  Symptoms: anxiety, grief, low mood, worry thoughts     CONTENT OF SESSION:  This was a followup appointment between provider and patient for individual therapy to address symptoms of trauma and anxiety and grief, associated with reproductive loss.      Focus of session was on Adrienne's anxiety and trauma-related symptoms (associated with traumatic reproductive loss) in response to anticipating trying to conceive again. She also expressed grief associated with her sister's pregnancy gender reveal, learning that she is expecting a boy. She also expressed some distress in decision-making about how to approach her sister's gender reveal event and feeling excessive feelings of guilt (e.g., \"am I making this all about me?\"). Provider used Socratic questioning to facilitate cognitive restructuring of potential unrealistic or unhelpful cognitions and patient was able to engage in cognitive restructuring. Provider reviewed psychoeducation on common psychological reactions to reproductive trauma and loss. Provider also gave pt positive feedback about her appropriate and effective assertive communication and limit setting. Provider gave supportive counseling and normalized common emotional " experiences.    Objective   Social History     Substance and Sexual Activity   Alcohol Use Yes    Alcohol/week: 0.0 - 1.0 standard drinks of alcohol      Social History     Social History Narrative    Not on file        Assessment/Plan   Problem List Items Addressed This Visit       Anxiety and depression    Adjustment disorder with depressed mood - Primary       Plan: plan made for individual CBT to address grief and posttraumatic stress symptoms r/o PTSD and r/o generalized anxiety disorder.    PRACTICE EXERCISES PROVIDED CUMULATIVELY: read handout on DEAR MAN . read handout provided on sleep hygiene, and practice cognitive defusion as discussed. practice cognitive restructuring and use the thought record form and challenging questions worksheet. read handouts provided on loss, trauma, and ptsd. practice behavioral activation as discussed (read handout). look at website resource provided on PTSD treatment. followup in 2 weeks

## 2025-05-12 ENCOUNTER — APPOINTMENT (OUTPATIENT)
Dept: BEHAVIORAL HEALTH | Facility: CLINIC | Age: 35
End: 2025-05-12
Payer: COMMERCIAL

## 2025-05-12 DIAGNOSIS — F41.9 ANXIETY AND DEPRESSION: ICD-10-CM

## 2025-05-12 DIAGNOSIS — F32.A ANXIETY AND DEPRESSION: ICD-10-CM

## 2025-05-12 DIAGNOSIS — F43.21 ADJUSTMENT DISORDER WITH DEPRESSED MOOD: Primary | ICD-10-CM

## 2025-05-12 PROCEDURE — 90837 PSYTX W PT 60 MINUTES: CPT | Performed by: PSYCHOLOGIST

## 2025-05-12 NOTE — PROGRESS NOTES
START TIME: 5:10   END TIME: 6:05  TOTAL TIME FACE TO FACE:  55min    Subjective   Patient ID: Adrienne Cedeño is a 34 y.o. female who presents for   Problem List Items Addressed This Visit       Anxiety and depression    Adjustment disorder with depressed mood - Primary   .    Virtual or Telephone Consent    An interactive audio and video telecommunication system which permits real time communications between the patient (at the originating site) and provider (at the distant site) was utilized to provide this telehealth service.   Verbal consent was requested and obtained from Adrienne Cedeño on this date, 05/12/25 for a telehealth visit and the patient's location was confirmed at the time of the visit.     .sensitive note documentation. Confirmed pt. Location at home.    TREATMENT: cognitive behavior therapy  Symptoms: anxiety, grief, low mood, worry thoughts     CONTENT OF SESSION:  This was a followup appointment between provider and patient for individual therapy to address symptoms of trauma and anxiety and grief, associated with reproductive loss.       Adrienne reported some psychological distress in context of reproductive loss and family planning. She identified normative triggers yesterday with Mother's Day, and appreciated that others responded to her requests for more social support and acknowledgment. She also discussed some frustration with miscommunication with her  and frustration with his lack of emotional expression at times. They are attempting to conceive again. Provider reviewed psychoeducation on common psychological reactions to reproductive loss and trauma. Time was also spent discussing potential benefit of having a planned, boundaried time for communicating regarding family planning and grief as a couple. Provider gave supportive counseling and normalized common emotional experiences.    Objective   Social History     Substance and Sexual Activity   Alcohol Use Yes    Alcohol/week: 0.0  - 1.0 standard drinks of alcohol      Social History     Social History Narrative    Not on file        Assessment/Plan   Problem List Items Addressed This Visit       Anxiety and depression    Adjustment disorder with depressed mood - Primary     Plan: plan made for individual CBT to address grief and posttraumatic stress symptoms r/o PTSD and r/o generalized anxiety disorder.    PRACTICE EXERCISES PROVIDED CUMULATIVELY: read handout on DEAR MAN . read handout provided on sleep hygiene, and practice cognitive defusion as discussed. practice cognitive restructuring and use the thought record form and challenging questions worksheet. read handouts provided on loss, trauma, and ptsd. practice behavioral activation as discussed (read handout). look at website resource provided on PTSD treatment. followup in 2 weeks

## 2025-05-28 ENCOUNTER — APPOINTMENT (OUTPATIENT)
Dept: BEHAVIORAL HEALTH | Facility: CLINIC | Age: 35
End: 2025-05-28
Payer: COMMERCIAL

## 2025-05-28 DIAGNOSIS — F32.A ANXIETY AND DEPRESSION: ICD-10-CM

## 2025-05-28 DIAGNOSIS — F41.9 ANXIETY AND DEPRESSION: ICD-10-CM

## 2025-05-28 DIAGNOSIS — F43.21 ADJUSTMENT DISORDER WITH DEPRESSED MOOD: Primary | ICD-10-CM

## 2025-05-28 PROCEDURE — 90837 PSYTX W PT 60 MINUTES: CPT | Performed by: PSYCHOLOGIST

## 2025-05-28 NOTE — PROGRESS NOTES
START TIME: 4:05   END TIME: 5  TOTAL TIME FACE TO FACE: 55min    Subjective   Patient ID: Adrienne Cedeño is a 34 y.o. female who presents for   Problem List Items Addressed This Visit       Anxiety and depression    Adjustment disorder with depressed mood - Primary   .    Virtual or Telephone Consent    An interactive audio and video telecommunication system which permits real time communications between the patient (at the originating site) and provider (at the distant site) was utilized to provide this telehealth service.   Verbal consent was requested and obtained from Adrienne Cedeño on this date, 05/28/25 for a telehealth visit and the patient's location was confirmed at the time of the visit.     sensitive note documentation. Confirmed pt. Location at home.     TREATMENT: cognitive behavior therapy  Symptoms: anxiety, grief, low mood, worry thoughts     CONTENT OF SESSION:  This was a followup appointment between provider and patient for individual therapy to address symptoms of trauma and anxiety and grief, associated with reproductive loss.        Adrienne reported generally good mood and adjustment in context of attempting to conceive after traumatic loss; she took a pregnancy test earlier this week (it was negative). She described appropriate coping in response and is still hopeful. She noted some relationship distress in context of timed intercourse; provider gave psychoeducation on impact of infertility on intimacy and on strategies to promote intimacy (e.g., engaging in touch outside of fertile window). Adrienne also discussed her recent use of assertive communication with her  surrounding the topic of grief; Provider gave positive feedback about this skill use. Provider reviewed psychoeducation on common psychological reactions to reproductive loss and trauma.  Provider gave supportive counseling and normalized common emotional experiences.    Objective   Social History     Substance and Sexual  Activity   Alcohol Use Yes    Alcohol/week: 0.0 - 1.0 standard drinks of alcohol      Social History     Social History Narrative    Not on file        Assessment/Plan   Problem List Items Addressed This Visit       Anxiety and depression    Adjustment disorder with depressed mood - Primary     Plan: plan made for individual CBT to address grief and posttraumatic stress symptoms r/o PTSD and r/o generalized anxiety disorder.    PRACTICE EXERCISES PROVIDED CUMULATIVELY: read handout on [employer] . read handout provided on sleep hygiene, and practice cognitive defusion as discussed. practice cognitive restructuring and use the thought record form and challenging questions worksheet. read handouts provided on loss, trauma, and ptsd. practice behavioral activation as discussed (read handout). look at website resource provided on PTSD treatment. followup in 2 weeks

## 2025-06-11 ENCOUNTER — APPOINTMENT (OUTPATIENT)
Dept: BEHAVIORAL HEALTH | Facility: CLINIC | Age: 35
End: 2025-06-11
Payer: COMMERCIAL

## 2025-06-11 DIAGNOSIS — F32.A ANXIETY AND DEPRESSION: ICD-10-CM

## 2025-06-11 DIAGNOSIS — F41.9 ANXIETY AND DEPRESSION: ICD-10-CM

## 2025-06-11 DIAGNOSIS — F43.21 ADJUSTMENT DISORDER WITH DEPRESSED MOOD: Primary | ICD-10-CM

## 2025-06-11 PROCEDURE — 90837 PSYTX W PT 60 MINUTES: CPT | Performed by: PSYCHOLOGIST

## 2025-06-12 NOTE — PROGRESS NOTES
START TIME: 4:05   END TIME: 5  TOTAL TIME FACE TO FACE: 55mins    Subjective   Patient ID: Adrienne Cedeño is a 34 y.o. female who presents for   Problem List Items Addressed This Visit       Anxiety and depression    Adjustment disorder with depressed mood - Primary   .    Virtual or Telephone Consent    An interactive audio and video telecommunication system which permits real time communications between the patient (at the originating site) and provider (at the distant site) was utilized to provide this telehealth service.   Verbal consent was requested and obtained from Adrienne Cedeño on this date, 6/11/25 for a telehealth visit and the patient's location was confirmed at the time of the visit.     sensitive note documentation. Confirmed pt. Location at home.     TREATMENT: cognitive behavior therapy  Symptoms: anxiety, grief, low mood, worry thoughts     CONTENT OF SESSION:  This was a followup appointment between provider and patient for individual therapy to address symptoms of trauma and anxiety and grief, associated with reproductive loss.        Adrienne reported generally good mood and adjustment in context of attempting to conceive after traumatic loss. She is currently in fertile window and noted some anxiety about uncertainties in terms of fertility, but feels able to cope with this anxiety. She has been minimizing anxious avoidance behaviors (e.g., minimizing over-checking [place] ring) and experiencing less rumination. Provider gave positive feedback about this skill use.  Time was spent on emotional processing of reminders of past loss; Adrienne recently learned that her sister-in-law is pregnant. Provider reviewed psychoeducation on common psychological reactions to reproductive loss and trauma. Time was also spent on emotional processing of grief related to Adrienne's grandfather's health decline and impact on family. Provider gave supportive counseling and normalized common emotional experiences.        Objective   Social History     Substance and Sexual Activity   Alcohol Use Yes    Alcohol/week: 0.0 - 1.0 standard drinks of alcohol      Social History     Social History Narrative    Not on file        Assessment/Plan   Problem List Items Addressed This Visit       Anxiety and depression    Adjustment disorder with depressed mood - Primary     Plan: plan made for individual CBT to address grief and posttraumatic stress symptoms r/o PTSD and r/o generalized anxiety disorder.    PRACTICE EXERCISES PROVIDED CUMULATIVELY: read handout on [employer] . read handout provided on sleep hygiene, and practice cognitive defusion as discussed. practice cognitive restructuring and use the thought record form and challenging questions worksheet. read handouts provided on loss, trauma, and ptsd. practice behavioral activation as discussed (read handout). look at website resource provided on PTSD treatment. followup in 2 weeks

## 2025-06-24 ENCOUNTER — APPOINTMENT (OUTPATIENT)
Dept: BEHAVIORAL HEALTH | Facility: CLINIC | Age: 35
End: 2025-06-24
Payer: COMMERCIAL

## 2025-06-24 DIAGNOSIS — F41.9 ANXIETY AND DEPRESSION: ICD-10-CM

## 2025-06-24 DIAGNOSIS — F32.A ANXIETY AND DEPRESSION: ICD-10-CM

## 2025-06-24 DIAGNOSIS — F43.21 ADJUSTMENT DISORDER WITH DEPRESSED MOOD: Primary | ICD-10-CM

## 2025-06-24 PROCEDURE — 90837 PSYTX W PT 60 MINUTES: CPT | Performed by: PSYCHOLOGIST

## 2025-06-24 NOTE — PROGRESS NOTES
"START TIME: 9:05   END TIME: 10   TOTAL TIME FACE TO FACE: 55min    Subjective   Patient ID: Adrienne Cedeño is a 35 y.o. female who presents for   Problem List Items Addressed This Visit       Anxiety and depression    Adjustment disorder with depressed mood - Primary   .    Virtual or Telephone Consent    An interactive audio and video telecommunication system which permits real time communications between the patient (at the originating site) and provider (at the distant site) was utilized to provide this telehealth service.   Verbal consent was requested and obtained from Adrienne Cedeño on this date, 06/24/25 for a telehealth visit and the patient's location was confirmed at the time of the visit.     sensitive note documentation. Confirmed pt. Location at home.     TREATMENT: cognitive behavior therapy  Symptoms: anxiety, grief, low mood, worry thoughts     CONTENT OF SESSION:  This was a followup appointment between provider and patient for individual therapy to address symptoms of trauma and anxiety and grief, associated with reproductive loss.      Adrienne reported acute grief and anxiety in context of recent negative pregnancy tests and reminders of past reproductive loss (e.g., seeing families with young children). Time was spent on emotional processing of these experiences. Adrienne described having some excessive guilt-based thoughts (e.g., \"I did something wrong\"). Provider reviewed psychoeducation on common psychological reactions to reproductive trauma and loss. Provider used Socratic questioning to facilitate cognitive restructuring of potential unrealistic or unhelpful cognitions and patient was able to engage in cognitive restructuring. Adrienne also discussed social reactions to processing this grief, especially with Jonathan as he has expressed feeling nervous about intended pregnancy (due to fear of another loss and fear of change.). Provider gave supportive counseling and normalized common emotional " experiences.    Objective   Social History     Substance and Sexual Activity   Alcohol Use Yes    Alcohol/week: 0.0 - 1.0 standard drinks of alcohol      Social History     Social History Narrative    Not on file        Assessment/Plan   Problem List Items Addressed This Visit       Anxiety and depression    Adjustment disorder with depressed mood - Primary     Plan: plan made for individual CBT to address grief and posttraumatic stress symptoms r/o PTSD and r/o generalized anxiety disorder.    PRACTICE EXERCISES PROVIDED CUMULATIVELY: read handout on [employer] . read handout provided on sleep hygiene, and practice cognitive defusion as discussed. practice cognitive restructuring and use the thought record form and challenging questions worksheet. read handouts provided on loss, trauma, and ptsd. practice behavioral activation as discussed (read handout). look at website resource provided on PTSD treatment. followup in 2 weeks

## 2025-07-17 ENCOUNTER — APPOINTMENT (OUTPATIENT)
Dept: BEHAVIORAL HEALTH | Facility: CLINIC | Age: 35
End: 2025-07-17
Payer: COMMERCIAL

## 2025-07-17 DIAGNOSIS — F41.9 ANXIETY AND DEPRESSION: ICD-10-CM

## 2025-07-17 DIAGNOSIS — F32.A ANXIETY AND DEPRESSION: ICD-10-CM

## 2025-07-17 DIAGNOSIS — F43.21 ADJUSTMENT DISORDER WITH DEPRESSED MOOD: Primary | ICD-10-CM

## 2025-07-17 PROCEDURE — 90837 PSYTX W PT 60 MINUTES: CPT | Performed by: PSYCHOLOGIST

## 2025-07-18 NOTE — PROGRESS NOTES
START TIME: 10:05   END TIME: 11  TOTAL TIME FACE TO FACE: 55min    Subjective   Patient ID: Adrienne Cedeño is a 35 y.o. female who presents for   Problem List Items Addressed This Visit       Anxiety and depression    Adjustment disorder with depressed mood - Primary   .    Virtual or Telephone Consent    An interactive audio and video telecommunication system which permits real time communications between the patient (at the originating site) and provider (at the distant site) was utilized to provide this telehealth service.   Verbal consent was requested and obtained from Adrienne Cedeño on this date, 7/17/25 for a telehealth visit and the patient's location was confirmed at the time of the visit.     sensitive note documentation. Confirmed pt. Location at home.     TREATMENT: cognitive behavior therapy  Symptoms: anxiety, grief, low mood, worry thoughts     CONTENT OF SESSION:  This was a followup appointment between provider and patient for individual therapy to address symptoms of trauma and anxiety and grief, associated with reproductive loss.      dArienne reported she has been feeling 'okay'; she is in the two-week wait before taking pregnancy test. Time was spent on emotional processing of Adrienne's experience with the recent death of her grandfather. She described normative emotional response to loss and grief, and appropriate engagement in coping skills (e.g., social support engagement). Provider reviewed psychoeducation on nature and function of grief.  Time was also spent on emotional processing of Adrienne's experience with some negative experiences from a family member; provider gave psychoeducation on common psychological responses to emotional abuse. Provider gave supportive counseling and normalized common emotional experiences.    Objective   Social History     Substance and Sexual Activity   Alcohol Use Yes    Alcohol/week: 0.0 - 1.0 standard drinks of alcohol      Social History     Social History  Narrative    Not on file        Assessment/Plan   Problem List Items Addressed This Visit       Anxiety and depression    Adjustment disorder with depressed mood - Primary       Plan: plan made for individual CBT to address grief and posttraumatic stress symptoms r/o PTSD and r/o generalized anxiety disorder.    PRACTICE EXERCISES PROVIDED CUMULATIVELY: read handout on [employer] . read handout provided on sleep hygiene, and practice cognitive defusion as discussed. practice cognitive restructuring and use the thought record form and challenging questions worksheet. read handouts provided on loss, trauma, and ptsd. practice behavioral activation as discussed (read handout). look at website resource provided on PTSD treatment. followup in 2 weeks

## 2025-07-30 DIAGNOSIS — Z34.80 SUPERVISION OF OTHER NORMAL PREGNANCY, ANTEPARTUM (HHS-HCC): ICD-10-CM

## 2025-08-04 ENCOUNTER — APPOINTMENT (OUTPATIENT)
Dept: BEHAVIORAL HEALTH | Facility: CLINIC | Age: 35
End: 2025-08-04
Payer: COMMERCIAL

## 2025-08-04 DIAGNOSIS — F32.A ANXIETY AND DEPRESSION: ICD-10-CM

## 2025-08-04 DIAGNOSIS — F43.21 ADJUSTMENT DISORDER WITH DEPRESSED MOOD: Primary | ICD-10-CM

## 2025-08-04 DIAGNOSIS — F41.9 ANXIETY AND DEPRESSION: ICD-10-CM

## 2025-08-04 PROCEDURE — 90837 PSYTX W PT 60 MINUTES: CPT | Performed by: PSYCHOLOGIST

## 2025-08-05 NOTE — PROGRESS NOTES
START TIME: 2:05  END TIME: 3  TOTAL TIME FACE TO FACE: 55min    Subjective   Patient ID: Adrienne Cedeño is a 35 y.o. female who presents for   Problem List Items Addressed This Visit       Anxiety and depression    Adjustment disorder with depressed mood - Primary   .    Virtual or Telephone Consent    An interactive audio and video telecommunication system which permits real time communications between the patient (at the originating site) and provider (at the distant site) was utilized to provide this telehealth service.   Verbal consent was requested and obtained from Adrienne Cedeño on this date, 08/04/25 for a telehealth visit and the patient's location was confirmed at the time of the visit.     sensitive note documentation. Confirmed pt. Location at home.     TREATMENT: cognitive behavior therapy  Symptoms: anxiety, grief, low mood, worry thoughts     CONTENT OF SESSION:  This was a followup appointment between provider and patient for individual therapy to address symptoms of trauma and anxiety and grief, associated with reproductive loss.     Focus of session was on emotional processing of Adrienne's recent positive pregnancy tests. She acknowledged heightened anxiety and triggering of past reproductive loss (e.g., what if worry thoughts of getting HELLP or losing pregnancy). Provider used Socratic questioning to facilitate cognitive restructuring of potential unrealistic or unhelpful cognitions and patient was able to engage in cognitive restructuring. Time was spent on review of cognitive restructuring skills to manage anxious thoughts. Adrienne was participative in session. She also scheduled with a clinic for an early pregnancy scan; she identified reasonable expectations for this visit.  Provider also encouraged her to reach out to her OB with questions, especially as her usual MFM is out of office at this time. Time was also spent on role play of assertive communication as Adrienne is preparing to talk  with her new boss about needing potential time for appts etc. Provider also reviewed psychoeducation on common psychological reactions to pregnancy in the context of past reproductive trauma and loss. Provider gave supportive counseling and normalized common emotional experiences.    Objective   Social History     Substance and Sexual Activity   Alcohol Use Yes    Alcohol/week: 0.0 - 1.0 standard drinks of alcohol      Social History     Social History Narrative    Not on file        Assessment/Plan   Problem List Items Addressed This Visit       Anxiety and depression    Adjustment disorder with depressed mood - Primary     Plan: plan made for individual CBT to address grief and posttraumatic stress symptoms r/o PTSD and r/o generalized anxiety disorder.    PRACTICE EXERCISES PROVIDED CUMULATIVELY: read handout on [employer] . read handout provided on sleep hygiene, and practice cognitive defusion as discussed. practice cognitive restructuring and use the thought record form and challenging questions worksheet. read handouts provided on loss, trauma, and ptsd. practice behavioral activation as discussed (read handout). look at website resource provided on PTSD treatment. followup in 2 weeks

## 2025-08-18 ENCOUNTER — APPOINTMENT (OUTPATIENT)
Dept: BEHAVIORAL HEALTH | Facility: CLINIC | Age: 35
End: 2025-08-18
Payer: COMMERCIAL

## 2025-08-18 DIAGNOSIS — F43.21 ADJUSTMENT DISORDER WITH DEPRESSED MOOD: Primary | ICD-10-CM

## 2025-08-18 DIAGNOSIS — F41.9 ANXIETY AND DEPRESSION: ICD-10-CM

## 2025-08-18 DIAGNOSIS — F32.A ANXIETY AND DEPRESSION: ICD-10-CM

## 2025-08-18 PROCEDURE — 90834 PSYTX W PT 45 MINUTES: CPT | Performed by: PSYCHOLOGIST

## 2025-08-21 ENCOUNTER — LAB REQUISITION (OUTPATIENT)
Dept: LAB | Facility: HOSPITAL | Age: 35
End: 2025-08-21
Payer: COMMERCIAL

## 2025-08-21 ENCOUNTER — TELEPHONE (OUTPATIENT)
Dept: GENETICS | Facility: CLINIC | Age: 35
End: 2025-08-21

## 2025-08-21 ENCOUNTER — HOSPITAL ENCOUNTER (OUTPATIENT)
Dept: RADIOLOGY | Facility: CLINIC | Age: 35
Discharge: HOME | End: 2025-08-21
Payer: COMMERCIAL

## 2025-08-21 ENCOUNTER — CONSULT (OUTPATIENT)
Dept: MATERNAL FETAL MEDICINE | Facility: CLINIC | Age: 35
End: 2025-08-21
Payer: COMMERCIAL

## 2025-08-21 ENCOUNTER — LAB (OUTPATIENT)
Dept: LAB | Facility: HOSPITAL | Age: 35
End: 2025-08-21
Payer: COMMERCIAL

## 2025-08-21 VITALS
HEART RATE: 111 BPM | BODY MASS INDEX: 37.79 KG/M2 | WEIGHT: 227.1 LBS | SYSTOLIC BLOOD PRESSURE: 124 MMHG | DIASTOLIC BLOOD PRESSURE: 87 MMHG

## 2025-08-21 DIAGNOSIS — Z34.80 SUPERVISION OF OTHER NORMAL PREGNANCY, ANTEPARTUM (HHS-HCC): ICD-10-CM

## 2025-08-21 DIAGNOSIS — Z14.8 CARRIER OF GENETIC DISORDER: ICD-10-CM

## 2025-08-21 DIAGNOSIS — Z3A.08 8 WEEKS GESTATION OF PREGNANCY (HHS-HCC): ICD-10-CM

## 2025-08-21 DIAGNOSIS — O09.91 SUPERVISION OF HIGH RISK PREGNANCY, ANTEPARTUM, FIRST TRIMESTER: ICD-10-CM

## 2025-08-21 DIAGNOSIS — O09.299 HISTORY OF HELLP SYNDROME, CURRENTLY PREGNANT (HHS-HCC): Primary | ICD-10-CM

## 2025-08-21 DIAGNOSIS — F32.A ANXIETY AND DEPRESSION: ICD-10-CM

## 2025-08-21 DIAGNOSIS — R76.8 DS DNA ANTIBODY POSITIVE: ICD-10-CM

## 2025-08-21 DIAGNOSIS — O09.91 SUPERVISION OF HIGH RISK PREGNANCY, UNSPECIFIED, FIRST TRIMESTER (HHS-HCC): ICD-10-CM

## 2025-08-21 DIAGNOSIS — Z3A.24 24 WEEKS GESTATION OF PREGNANCY (HHS-HCC): Primary | ICD-10-CM

## 2025-08-21 DIAGNOSIS — F41.9 ANXIETY AND DEPRESSION: ICD-10-CM

## 2025-08-21 LAB
ALBUMIN SERPL BCP-MCNC: 4.6 G/DL (ref 3.4–5)
ALP SERPL-CCNC: 63 U/L (ref 33–110)
ALT SERPL W P-5'-P-CCNC: 16 U/L (ref 7–45)
ANION GAP SERPL CALC-SCNC: 12 MMOL/L (ref 10–20)
AST SERPL W P-5'-P-CCNC: 14 U/L (ref 9–39)
BILIRUB SERPL-MCNC: 0.8 MG/DL (ref 0–1.2)
BUN SERPL-MCNC: 9 MG/DL (ref 6–23)
CALCIUM SERPL-MCNC: 9.5 MG/DL (ref 8.6–10.6)
CHLORIDE SERPL-SCNC: 104 MMOL/L (ref 98–107)
CO2 SERPL-SCNC: 24 MMOL/L (ref 21–32)
CREAT SERPL-MCNC: 0.76 MG/DL (ref 0.5–1.05)
EGFRCR SERPLBLD CKD-EPI 2021: >90 ML/MIN/1.73M*2
ERYTHROCYTE [DISTWIDTH] IN BLOOD BY AUTOMATED COUNT: 12.4 % (ref 11.5–14.5)
GLUCOSE SERPL-MCNC: 99 MG/DL (ref 74–99)
HCT VFR BLD AUTO: 41.9 % (ref 36–46)
HGB BLD-MCNC: 13.6 G/DL (ref 12–16)
MCH RBC QN AUTO: 30 PG (ref 26–34)
MCHC RBC AUTO-ENTMCNC: 32.5 G/DL (ref 32–36)
MCV RBC AUTO: 93 FL (ref 80–100)
NRBC BLD-RTO: 0 /100 WBCS (ref 0–0)
PLATELET # BLD AUTO: 309 X10*3/UL (ref 150–450)
POTASSIUM SERPL-SCNC: 4.1 MMOL/L (ref 3.5–5.3)
PROT SERPL-MCNC: 7.5 G/DL (ref 6.4–8.2)
RBC # BLD AUTO: 4.53 X10*6/UL (ref 4–5.2)
REFLEX ADDED, ANEMIA PANEL: NORMAL
SODIUM SERPL-SCNC: 136 MMOL/L (ref 136–145)
WBC # BLD AUTO: 9.7 X10*3/UL (ref 4.4–11.3)

## 2025-08-21 PROCEDURE — 85027 COMPLETE CBC AUTOMATED: CPT

## 2025-08-21 PROCEDURE — 80053 COMPREHEN METABOLIC PANEL: CPT

## 2025-08-21 PROCEDURE — 83021 HEMOGLOBIN CHROMOTOGRAPHY: CPT

## 2025-08-21 PROCEDURE — 99212 OFFICE O/P EST SF 10 MIN: CPT | Mod: 25 | Performed by: STUDENT IN AN ORGANIZED HEALTH CARE EDUCATION/TRAINING PROGRAM

## 2025-08-21 PROCEDURE — 76801 OB US < 14 WKS SINGLE FETUS: CPT

## 2025-08-21 ASSESSMENT — EDINBURGH POSTNATAL DEPRESSION SCALE (EPDS)
I HAVE BEEN SO UNHAPPY THAT I HAVE BEEN CRYING: NO, NEVER
I HAVE FELT SCARED OR PANICKY FOR NO GOOD REASON: NO, NOT AT ALL
THE THOUGHT OF HARMING MYSELF HAS OCCURRED TO ME: NEVER
I HAVE FELT SAD OR MISERABLE: NO, NOT AT ALL
I HAVE BEEN SO UNHAPPY THAT I HAVE HAD DIFFICULTY SLEEPING: NOT AT ALL
TOTAL SCORE: 2
I HAVE BEEN ABLE TO LAUGH AND SEE THE FUNNY SIDE OF THINGS: AS MUCH AS I ALWAYS COULD
I HAVE BEEN ANXIOUS OR WORRIED FOR NO GOOD REASON: YES, SOMETIMES
I HAVE LOOKED FORWARD WITH ENJOYMENT TO THINGS: AS MUCH AS I EVER DID
I HAVE BLAMED MYSELF UNNECESSARILY WHEN THINGS WENT WRONG: NO, NEVER
THINGS HAVE BEEN GETTING ON TOP OF ME: NO, I HAVE BEEN COPING AS WELL AS EVER

## 2025-08-21 ASSESSMENT — PATIENT HEALTH QUESTIONNAIRE - PHQ9
1. LITTLE INTEREST OR PLEASURE IN DOING THINGS: NOT AT ALL
2. FEELING DOWN, DEPRESSED OR HOPELESS: NOT AT ALL
SUM OF ALL RESPONSES TO PHQ9 QUESTIONS 1 AND 2: 0

## 2025-08-21 ASSESSMENT — PAIN SCALES - GENERAL: PAINLEVEL_OUTOF10: 0-NO PAIN

## 2025-08-22 LAB
HBV CORE AB SERPL QL IA: NORMAL
HBV SURFACE AB SERPL IA-ACNC: <5 MIU/ML
HBV SURFACE AG SERPL QL IA: NORMAL
HCV AB SERPL QL IA: NORMAL
HIV 1+2 AB+HIV1 P24 AG SERPL QL IA: NORMAL
HIV 1+2 AB+HIV1 P24 AG SERPL QL IA: NORMAL
HOLD SPECIMEN: NORMAL
RUBV IGG SERPL IA-ACNC: NORMAL
T PALLIDUM AB SER QL IA: NORMAL

## 2025-08-23 PROBLEM — Z78.9 NOT IMMUNE TO HEPATITIS B VIRUS: Status: ACTIVE | Noted: 2025-08-23

## 2025-08-23 LAB
ANA SER QL IF: NEGATIVE
B2 GLYCOPROT1 IGA SER-ACNC: NORMAL
B2 GLYCOPROT1 IGG SER-ACNC: NORMAL
B2 GLYCOPROT1 IGM SER-ACNC: NORMAL
BACTERIA UR CULT: NORMAL
C TRACH RRNA SPEC QL NAA+PROBE: NOT DETECTED
C3 SERPL-MCNC: 150 MG/DL (ref 83–193)
C4 SERPL-MCNC: 22 MG/DL (ref 15–57)
CARDIOLIPIN IGA SER IA-ACNC: <2 APL-U/ML
CARDIOLIPIN IGG SER IA-ACNC: <2 GPL-U/ML
CARDIOLIPIN IGM SER IA-ACNC: <2 MPL-U/ML
CENTROMERE B AB SER-ACNC: ABNORMAL AI
CREAT UR-MCNC: 74 MG/DL (ref 20–275)
DSDNA AB SER-ACNC: 10 IU/ML
ENA JO1 AB SER IA-ACNC: ABNORMAL AI
ENA RNP AB SER-ACNC: ABNORMAL AI
ENA SCL70 AB SER IA-ACNC: ABNORMAL AI
ENA SM AB SER IA-ACNC: ABNORMAL AI
ENA SM+RNP AB SER IA-ACNC: ABNORMAL AI
ENA SS-A AB SER IA-ACNC: ABNORMAL AI
ENA SS-B AB SER IA-ACNC: ABNORMAL AI
HEMOGLOBIN A2: 3.1 % (ref 2–3.5)
HEMOGLOBIN A: 96.4 % (ref 95.8–98)
HEMOGLOBIN F: 0.5 % (ref 0–2)
HEMOGLOBIN IDENTIFICATION INTERPRETATION: NORMAL
LA 2 SCREEN W REFLEX-IMP: NORMAL
N GONORRHOEA RRNA SPEC QL NAA+PROBE: NOT DETECTED
NUCLEOSOME AB SER IA-ACNC: ABNORMAL AI
PATH REVIEW-HGB IDENTIFICATION: NORMAL
PROT UR-MCNC: 4 MG/DL (ref 5–24)
PROT/CREAT UR: 0.05 MG/MG CREAT (ref 0.02–0.18)
PROT/CREAT UR: 54 MG/G CREAT (ref 24–184)
QUEST GC CT AMPLIFIED (ALWAYS MESSAGE): NORMAL
RIBOSOMAL P AB SER-ACNC: ABNORMAL AI
SCREEN APTT: NORMAL S
SCREEN DRVVT: NORMAL S

## 2025-08-25 LAB
ANA SER QL IF: NEGATIVE
B2 GLYCOPROT1 IGA SER-ACNC: <2 U/ML
B2 GLYCOPROT1 IGG SER-ACNC: <2 U/ML
B2 GLYCOPROT1 IGM SER-ACNC: <2 U/ML
C3 SERPL-MCNC: 150 MG/DL (ref 83–193)
C4 SERPL-MCNC: 22 MG/DL (ref 15–57)
CARDIOLIPIN IGA SER IA-ACNC: <2 APL-U/ML
CARDIOLIPIN IGG SER IA-ACNC: <2 GPL-U/ML
CARDIOLIPIN IGM SER IA-ACNC: <2 MPL-U/ML
CENTROMERE B AB SER-ACNC: ABNORMAL AI
DSDNA AB SER-ACNC: 10 IU/ML
ENA JO1 AB SER IA-ACNC: ABNORMAL AI
ENA RNP AB SER-ACNC: ABNORMAL AI
ENA SCL70 AB SER IA-ACNC: ABNORMAL AI
ENA SM AB SER IA-ACNC: ABNORMAL AI
ENA SM+RNP AB SER IA-ACNC: ABNORMAL AI
ENA SS-A AB SER IA-ACNC: ABNORMAL AI
ENA SS-B AB SER IA-ACNC: ABNORMAL AI
LA 2 SCREEN W REFLEX-IMP: NORMAL
NUCLEOSOME AB SER IA-ACNC: ABNORMAL AI
RIBOSOMAL P AB SER-ACNC: ABNORMAL AI
SCREEN APTT: 35 SEC
SCREEN DRVVT: 36 SEC

## 2025-08-27 ENCOUNTER — TELEMEDICINE CLINICAL SUPPORT (OUTPATIENT)
Dept: GENETICS | Facility: CLINIC | Age: 35
End: 2025-08-27
Payer: COMMERCIAL

## 2025-08-27 DIAGNOSIS — Z14.8 CARRIER OF SPINAL MUSCULAR ATROPHY: ICD-10-CM

## 2025-08-27 DIAGNOSIS — Z34.91 FIRST TRIMESTER PREGNANCY (HHS-HCC): ICD-10-CM

## 2025-08-27 DIAGNOSIS — Z31.5 ENCOUNTER FOR PROCREATIVE GENETIC COUNSELING: ICD-10-CM

## 2025-08-27 DIAGNOSIS — Z14.8 CARRIER OF GENETIC DISORDER: ICD-10-CM

## 2025-08-27 PROCEDURE — 96041 GENETIC COUNSELING SVC EA 30: CPT

## 2025-08-28 LAB
HBV CORE AB SERPL QL IA: NORMAL
HBV SURFACE AB SERPL IA-ACNC: <5 MIU/ML
HBV SURFACE AG SERPL QL IA: NORMAL
HCV AB SERPL QL IA: NORMAL
HIV 1+2 AB+HIV1 P24 AG SERPL QL IA: NORMAL
HIV 1+2 AB+HIV1 P24 AG SERPL QL IA: NORMAL
RUBV IGG SERPL IA-ACNC: 1.88 INDEX
T PALLIDUM AB SER QL IA: NEGATIVE

## 2025-09-03 ENCOUNTER — APPOINTMENT (OUTPATIENT)
Dept: BEHAVIORAL HEALTH | Facility: CLINIC | Age: 35
End: 2025-09-03
Payer: COMMERCIAL

## 2025-09-17 ENCOUNTER — APPOINTMENT (OUTPATIENT)
Dept: BEHAVIORAL HEALTH | Facility: CLINIC | Age: 35
End: 2025-09-17
Payer: COMMERCIAL

## 2025-10-01 ENCOUNTER — APPOINTMENT (OUTPATIENT)
Dept: BEHAVIORAL HEALTH | Facility: CLINIC | Age: 35
End: 2025-10-01
Payer: COMMERCIAL

## 2025-10-20 ENCOUNTER — APPOINTMENT (OUTPATIENT)
Dept: BEHAVIORAL HEALTH | Facility: CLINIC | Age: 35
End: 2025-10-20
Payer: COMMERCIAL

## 2026-01-07 ENCOUNTER — APPOINTMENT (OUTPATIENT)
Dept: BEHAVIORAL HEALTH | Facility: CLINIC | Age: 36
End: 2026-01-07
Payer: COMMERCIAL

## 2026-01-21 ENCOUNTER — APPOINTMENT (OUTPATIENT)
Dept: BEHAVIORAL HEALTH | Facility: CLINIC | Age: 36
End: 2026-01-21
Payer: COMMERCIAL

## 2026-02-04 ENCOUNTER — APPOINTMENT (OUTPATIENT)
Dept: BEHAVIORAL HEALTH | Facility: CLINIC | Age: 36
End: 2026-02-04
Payer: COMMERCIAL

## 2026-02-18 ENCOUNTER — APPOINTMENT (OUTPATIENT)
Dept: BEHAVIORAL HEALTH | Facility: CLINIC | Age: 36
End: 2026-02-18
Payer: COMMERCIAL